# Patient Record
Sex: FEMALE | Race: WHITE | NOT HISPANIC OR LATINO | Employment: UNEMPLOYED | URBAN - METROPOLITAN AREA
[De-identification: names, ages, dates, MRNs, and addresses within clinical notes are randomized per-mention and may not be internally consistent; named-entity substitution may affect disease eponyms.]

---

## 2018-07-17 ENCOUNTER — OFFICE VISIT (OUTPATIENT)
Dept: FAMILY MEDICINE | Facility: CLINIC | Age: 33
End: 2018-07-17
Payer: MEDICARE

## 2018-07-17 ENCOUNTER — TELEPHONE (OUTPATIENT)
Dept: FAMILY MEDICINE | Facility: CLINIC | Age: 33
End: 2018-07-17

## 2018-07-17 ENCOUNTER — RADIANT APPOINTMENT (OUTPATIENT)
Dept: GENERAL RADIOLOGY | Facility: CLINIC | Age: 33
End: 2018-07-17
Payer: MEDICARE

## 2018-07-17 VITALS
WEIGHT: 154 LBS | SYSTOLIC BLOOD PRESSURE: 118 MMHG | TEMPERATURE: 98.2 F | HEART RATE: 70 BPM | DIASTOLIC BLOOD PRESSURE: 77 MMHG | RESPIRATION RATE: 14 BRPM | HEIGHT: 66 IN | BODY MASS INDEX: 24.75 KG/M2 | OXYGEN SATURATION: 99 %

## 2018-07-17 DIAGNOSIS — N89.8 VAGINAL DISCHARGE: ICD-10-CM

## 2018-07-17 DIAGNOSIS — G47.00 INSOMNIA, UNSPECIFIED TYPE: ICD-10-CM

## 2018-07-17 DIAGNOSIS — J30.81 CHRONIC ALLERGIC RHINITIS DUE TO ANIMAL HAIR AND DANDER: ICD-10-CM

## 2018-07-17 DIAGNOSIS — J45.909 UNCOMPLICATED ASTHMA, UNSPECIFIED ASTHMA SEVERITY, UNSPECIFIED WHETHER PERSISTENT: ICD-10-CM

## 2018-07-17 DIAGNOSIS — M79.644 PAIN OF FINGER OF RIGHT HAND: ICD-10-CM

## 2018-07-17 DIAGNOSIS — D47.02 MAST CELL DISEASE, SYSTEMIC: Chronic | ICD-10-CM

## 2018-07-17 DIAGNOSIS — E55.9 HYPOVITAMINOSIS D: ICD-10-CM

## 2018-07-17 DIAGNOSIS — F43.23 ADJUSTMENT DISORDER WITH MIXED ANXIETY AND DEPRESSED MOOD: ICD-10-CM

## 2018-07-17 DIAGNOSIS — Z11.3 SCREEN FOR STD (SEXUALLY TRANSMITTED DISEASE): Primary | ICD-10-CM

## 2018-07-17 DIAGNOSIS — M79.644 PAIN OF FINGER OF RIGHT HAND: Primary | ICD-10-CM

## 2018-07-17 LAB
CLUE CELLS: NORMAL
ERYTHROCYTE [DISTWIDTH] IN BLOOD BY AUTOMATED COUNT: 12.9 % (ref 10–15)
HCT VFR BLD AUTO: 43.3 % (ref 35–47)
HGB BLD-MCNC: 14.8 G/DL (ref 11.7–15.7)
MCH RBC QN AUTO: 32 PG (ref 26.5–33)
MCHC RBC AUTO-ENTMCNC: 34.2 G/DL (ref 31.5–36.5)
MCV RBC AUTO: 94 FL (ref 78–100)
MOTILE TRICHOMONAS: NEGATIVE
PLATELET # BLD AUTO: 220 10E9/L (ref 150–450)
RBC # BLD AUTO: 4.62 10E12/L (ref 3.8–5.2)
TSH SERPL DL<=0.005 MIU/L-ACNC: 2.02 MU/L (ref 0.4–4)
VIT B12 SERPL-MCNC: 356 PG/ML (ref 193–986)
WBC # BLD AUTO: 8.4 10E9/L (ref 4–11)
YEAST: NORMAL

## 2018-07-17 RX ORDER — FLUTICASONE PROPIONATE 50 MCG
2 SPRAY, SUSPENSION (ML) NASAL DAILY
Qty: 1 BOTTLE | Refills: 3 | Status: SHIPPED | OUTPATIENT
Start: 2018-07-17

## 2018-07-17 RX ORDER — AMITRIPTYLINE HYDROCHLORIDE 10 MG/1
TABLET ORAL
Qty: 30 TABLET | Refills: 0 | Status: SHIPPED | OUTPATIENT
Start: 2018-07-17

## 2018-07-17 RX ORDER — ALBUTEROL SULFATE 90 UG/1
1-2 AEROSOL, METERED RESPIRATORY (INHALATION) EVERY 4 HOURS PRN
Qty: 8.5 G | Refills: 2 | Status: SHIPPED | OUTPATIENT
Start: 2018-07-17

## 2018-07-17 RX ORDER — EPINEPHRINE 0.3 MG/.3ML
0.3 INJECTION SUBCUTANEOUS
Qty: 0.3 ML | Refills: 1 | Status: SHIPPED | OUTPATIENT
Start: 2018-07-17

## 2018-07-17 RX ORDER — LORATADINE 10 MG/1
10 TABLET ORAL DAILY
Qty: 30 TABLET | Refills: 11 | Status: SHIPPED | OUTPATIENT
Start: 2018-07-17

## 2018-07-17 RX ORDER — MULTIVIT WITH CALCIUM,IRON,MIN
1 TABLET ORAL DAILY
Qty: 100 TABLET | Refills: 1 | Status: SHIPPED | OUTPATIENT
Start: 2018-07-17

## 2018-07-17 ASSESSMENT — ANXIETY QUESTIONNAIRES
7. FEELING AFRAID AS IF SOMETHING AWFUL MIGHT HAPPEN: NEARLY EVERY DAY
3. WORRYING TOO MUCH ABOUT DIFFERENT THINGS: NEARLY EVERY DAY
GAD7 TOTAL SCORE: 21
IF YOU CHECKED OFF ANY PROBLEMS ON THIS QUESTIONNAIRE, HOW DIFFICULT HAVE THESE PROBLEMS MADE IT FOR YOU TO DO YOUR WORK, TAKE CARE OF THINGS AT HOME, OR GET ALONG WITH OTHER PEOPLE: EXTREMELY DIFFICULT
6. BECOMING EASILY ANNOYED OR IRRITABLE: NEARLY EVERY DAY
5. BEING SO RESTLESS THAT IT IS HARD TO SIT STILL: NEARLY EVERY DAY
IF YOU CHECKED OFF ANY PROBLEMS ON THIS QUESTIONNAIRE, HOW DIFFICULT HAVE THESE PROBLEMS MADE IT FOR YOU TO DO YOUR WORK, TAKE CARE OF THINGS AT HOME, OR GET ALONG WITH OTHER PEOPLE: EXTREMELY DIFFICULT
6. BECOMING EASILY ANNOYED OR IRRITABLE: NEARLY EVERY DAY
2. NOT BEING ABLE TO STOP OR CONTROL WORRYING: NEARLY EVERY DAY
1. FEELING NERVOUS, ANXIOUS, OR ON EDGE: NEARLY EVERY DAY
3. WORRYING TOO MUCH ABOUT DIFFERENT THINGS: NEARLY EVERY DAY
1. FEELING NERVOUS, ANXIOUS, OR ON EDGE: NEARLY EVERY DAY
GAD7 TOTAL SCORE: 21
2. NOT BEING ABLE TO STOP OR CONTROL WORRYING: NEARLY EVERY DAY
5. BEING SO RESTLESS THAT IT IS HARD TO SIT STILL: NEARLY EVERY DAY
7. FEELING AFRAID AS IF SOMETHING AWFUL MIGHT HAPPEN: NEARLY EVERY DAY

## 2018-07-17 ASSESSMENT — PATIENT HEALTH QUESTIONNAIRE - PHQ9
5. POOR APPETITE OR OVEREATING: NEARLY EVERY DAY
5. POOR APPETITE OR OVEREATING: NEARLY EVERY DAY

## 2018-07-17 NOTE — LETTER
July 18, 2018       TO: Reba Arboleda  [address]  [facility] [address]       DearMs.Nkechi,    We are writing to inform you of your test results.    Your test results fall within the expected range(s) or remain unchanged from previous results.  Please continue with current treatment plan.    Resulted Orders   NEISSERIA GONORRHOEA PCR   Result Value Ref Range    Specimen Descrip Vagina     N Gonorrhea PCR Negative NEG^Negative      Comment:      Negative for N. gonorrhoeae rRNA by transcription mediated amplification.  A negative result by transcription mediated amplification does not preclude   the presence of N. gonorrhoeae infection because results are dependent on   proper and adequate collection, absence of inhibitors, and sufficient rRNA to   be detected.     CHLAMYDIA TRACHOMATIS PCR   Result Value Ref Range    Specimen Description Vagina     Chlamydia Trachomatis PCR Negative NEG^Negative      Comment:      Negative for C. trachomatis rRNA by transcription mediated amplification.  A negative result by transcription mediated amplification does not preclude   the presence of C. trachomatis infection because results are dependent on   proper and adequate collection, absence of inhibitors, and sufficient rRNA to   be detected.     Treponema Abs w Reflex to RPR and Titer   Result Value Ref Range    Treponema Antibodies Nonreactive NR^Nonreactive   Herpes Simplex Virus 1 and 2 IgG   Result Value Ref Range    Herpes Simplex Virus Type 1 IgG 3.8 (H) 0.0 - 0.8 AI      Comment:      Positive.  IgG antibody to HSV-1 detected.  Antibody index (AI) values reflect qualitative changes in antibody   concentration that cannot be directly associated with clinical condition or   disease state.      Herpes Simplex Virus Type 2 IgG <0.2 0.0 - 0.8 AI      Comment:      No HSV-2 IgG antibodies detected.  Antibody index (AI) values reflect qualitative changes in antibody   concentration that cannot be directly associated with  clinical condition or   disease state.     Wet Prep (AP [facility] NP CLIN[facility])   Result Value Ref Range    Yeast Wet Prep None none    Motile Trichomonas Wet Prep Negative Negative    Clue Cells Wet Prep Present <20% NONE   Vitamin D Level   Result Value Ref Range    Vitamin D Deficiency screening 23 20 - 75 ug/L      Comment:      Season, race, dietary intake, and treatment affect the concentration of   25-hydroxy-Vitamin D. Values may decrease during winter months and increase   during summer months. Values 20-29 ug/L may indicate Vitamin D insufficiency   and values <20 ug/L may indicate Vitamin D deficiency.  Vitamin D determination is routinely performed by an immunoassay specific for   25 hydroxyvitamin D3.  If an individual is on vitamin D2 (ergocalciferol)   supplementation, please specify 25 OH vitamin D2 and D3 level determination by   LCMSMS test [id number].     Vitamin B12   Result Value Ref Range    Vitamin B12 356 193 - 986 pg/mL   CBC with platelets   Result Value Ref Range    WBC 8.4 4.0 - 11.0 10e9/L    RBC Count 4.62 3.8 - 5.2 10e12/L    Hemoglobin 14.8 11.7 - 15.7 g/dL    Hematocrit 43.3 35.0 - 47.0 %    MCV 94 78 - 100 fl    MCH 32.0 26.5 - 33.0 pg    MCHC 34.2 31.5 - 36.5 g/dL    RDW 12.9 10.0 - 15.0 %    Platelet Count 220 150 - 450 10e9/L   TSH with free T4 reflex   Result Value Ref Range    TSH 2.02 0.40 - 4.00 mU/L       Reba, your lab results to date look good. Your herpes test shows antibodies to Type 1 but not to Type 2 (genital) type so there is no change there from your baseline. Your chlamydia and gonorrhea tests are both negative as is the syphilis test. Complete blood count and thyroid are both within normal limits. The wet prep of your vaginal fluid is also listed here and we reviewed that in clinic. There is no evidence of infection that requires treatment. Your B12 level is within  Normal. Your vitamin D level is low so would recommend daily supplement of 3710-1968 units per day. We could  also replace at 50,000 units per week for 8 weeks. Let me know what you would like to do. The other results are still pending. Thank you,    Nadeen SNOW CNP

## 2018-07-17 NOTE — LETTER
July 19, 2018       TO: Reba Arboleda  [address] [facility] [address]       DearMs.Nkechi,    We are writing to inform you of your test results.    Test results indicate you may require additional follow up, see comment below.    Resulted Orders   HIV Antigen Antibody Combo   Result Value Ref Range    HIV Antigen Antibody Combo Nonreactive NR^Nonreactive          Comment:      HIV-1 p24 Ag & HIV-1/HIV-2 Ab Not Detected   NEISSERIA GONORRHOEA PCR   Result Value Ref Range    Specimen Descrip Vagina     N Gonorrhea PCR Negative NEG^Negative      Comment:      Negative for N. gonorrhoeae rRNA by transcription mediated amplification.  A negative result by transcription mediated amplification does not preclude   the presence of N. gonorrhoeae infection because results are dependent on   proper and adequate collection, absence of inhibitors, and sufficient rRNA to   be detected.     CHLAMYDIA TRACHOMATIS PCR   Result Value Ref Range    Specimen Description Vagina     Chlamydia Trachomatis PCR Negative NEG^Negative      Comment:      Negative for C. trachomatis rRNA by transcription mediated amplification.  A negative result by transcription mediated amplification does not preclude   the presence of C. trachomatis infection because results are dependent on   proper and adequate collection, absence of inhibitors, and sufficient rRNA to   be detected.     Treponema Abs w Reflex to RPR and Titer   Result Value Ref Range    Treponema Antibodies Nonreactive NR^Nonreactive   Herpes Simplex Virus 1 and 2 IgG   Result Value Ref Range    Herpes Simplex Virus Type 1 IgG 3.8 (H) 0.0 - 0.8 AI      Comment:      Positive.  IgG antibody to HSV-1 detected.  Antibody index (AI) values reflect qualitative changes in antibody   concentration that cannot be directly associated with clinical condition or   disease state.      Herpes Simplex Virus Type 2 IgG <0.2 0.0 - 0.8 AI      Comment:      No HSV-2 IgG antibodies detected.  Antibody  index (AI) values reflect qualitative changes in antibody   concentration that cannot be directly associated with clinical condition or   disease state.     Hepatitis C antibody   Result Value Ref Range    Hepatitis C Antibody Nonreactive NR^Nonreactive      Comment:      Assay performance characteristics have not been established for newborns,   infants, and children     Hepatitis B Surface Antibody   Result Value Ref Range    Hepatitis B Surface Antibody 1.41 <8.00 m[IU]/mL      Comment:      Nonreactive, No antibody detected when the value is less than 8.00 m[IU]/mL.   Hepatitis B surface antigen   Result Value Ref Range    Hep B Surface Agn Nonreactive NR^Nonreactive   Wet Prep (AP [facility] NP [facility]IN[facility])   Result Value Ref Range    Yeast Wet Prep None none    Motile Trichomonas Wet Prep Negative Negative    Clue Cells Wet Prep Present <20% NONE   Vitamin D Level   Result Value Ref Range    Vitamin D Deficiency screening 23 20 - 75 ug/L      Comment:      Season, race, dietary intake, and treatment affect the concentration of   25-hydroxy-Vitamin D. Values may decrease during winter months and increase   during summer months. Values 20-29 ug/L may indicate Vitamin D insufficiency   and values <20 ug/L may indicate Vitamin D deficiency.  Vitamin D determination is routinely performed by an immunoassay specific for   25 hydroxyvitamin D3.  If an individual is on vitamin D2 (ergocalciferol)   supplementation, please specify 25 OH vitamin D2 and D3 level determination by   LCMSMS test [id number].     Vitamin B12   Result Value Ref Range    Vitamin B12 356 193 - 986 pg/mL   CBC with platelets   Result Value Ref Range    WBC 8.4 4.0 - 11.0 10e9/L    RBC Count 4.62 3.8 - 5.2 10e12/L    Hemoglobin 14.8 11.7 - 15.7 g/dL    Hematocrit 43.3 35.0 - 47.0 %    MCV 94 78 - 100 fl    MCH 32.0 26.5 - 33.0 pg    MCHC 34.2 31.5 - 36.5 g/dL    RDW 12.9 10.0 - 15.0 %    Platelet Count 220 150 - 450 10e9/L   TSH with free T4 reflex   Result Value  Ref Range    TSH 2.02 0.40 - 4.00 mU/L       Here are the remainder of your lab tests. Screening for hepatitis B and hepatitis C and HIV all negative ( you do not have). You also do not have antibodies to hepatitis B so would recommend vaccination ( a series of 3 shots).     Please let me know if you have any questions.     Thank you,    Nadeen SNOW CNP

## 2018-07-17 NOTE — MR AVS SNAPSHOT
After Visit Summary   7/17/2018    Reba Arboleda    MRN: [id number]           Patient Information     Date Of Birth          1985        Visit Information        Provider Department      7/17/2018 9:30 AM Nadeen Melchor APRN CNP [facility]        Today's Diagnoses     Screen for STD (sexually transmitted disease)    -  1    Pain of finger of right hand        Adjustment disorder with mixed anxiety and depressed mood        Vaginal discharge        Mast cell disease, systemic        Uncomplicated asthma, unspecified asthma severity, unspecified whether persistent        Other chronic sinusitis        Chronic allergic rhinitis due to animal hair and dander        Hypovitaminosis D        Insomnia, unspecified type          Care Instructions    Right finger pain  Xray right finger  Go to [facility] [address] [place] [address]    Depression and insomnia  Discussed treatment with SSRis  Prefer Amitriptyline which she has used in the past for sleep and depression with good effect. Discussed risks. Seek care if suicidal. Gave numbers for walk in care  Given crisis numbers  Referred to [facility]  Amitriptyline started at 10mg per night, after 3 days, may increase to 2 tabs at bedtime, after another 3-7d, may increase to 3 tabs at night. Will cause drowsiness  Follow up here or with Primary care in 2 weeks  CBC, TSH, Vitamin D B12    STD screen  Wet prep: few clue cells. Will monitor for now.   Will notify when others come back.             Follow-ups after your visit        Your next 10 appointments already scheduled     Jul 26, 2018  1:25 PM CDT   (Arrive by 1:10 PM)   New Patient Visit with Izabela Whitman MD   [facility] ([facility])    [address] [place] [address]  4th Floor  [facility] [id number] [phone]              Future tests that were ordered for you today     Open Future Orders        Priority Expected Expires Ordered    XR  "FINGER RT Routine 7/17/2018 7/17/2019 7/17/2018            Who to contact     Please call your clinic at [phone] to:    Ask questions about your health    Make or cancel appointments    Discuss your medicines    Learn about your test results    Speak to your doctor            Additional Information About Your Visit        Orphazymehart Information     [employer]aredOut gives you secure access to your electronic health record. If you see a primary care provider, you can also send messages to your care team and make appointments. If you have questions, please call your primary care clinic.  If you do not have a primary care provider, please call [phone] and they will assist you.      [employer]aredOut is an electronic gateway that provides easy, online access to your medical records. With [employer], you can request a clinic appointment, read your test results, renew a prescription or communicate with your care team.     To access your existing account, please contact your [facility] or call [phone] for assistance.        Care EveryWhere ID     This is your Care EveryWhere ID. This could be used by other organizations to access your [facility] medical records  [id number]        Your Vitals Were     Pulse Temperature Respirations Height Pulse Oximetry Breastfeeding?    70 98.2  F (36.8  C) (Oral) 14 5' 6\" (167.6 cm) 99% No    BMI (Body Mass Index)                   24.86 kg/m2            Blood Pressure from Last 3 Encounters:   07/17/18 118/77   02/23/16 (!) 122/100   02/20/16 106/64    Weight from Last 3 Encounters:   07/17/18 154 lb (69.9 kg)   02/23/16 168 lb (76.2 kg)   02/20/16 167 lb (75.8 kg)              We Performed the Following     CBC with platelets     CHLAMYDIA TRACHOMATIS PCR     Hepatitis B Surface Antibody     Hepatitis B surface antigen     Hepatitis C antibody     Herpes Simplex Virus 1 and 2 IgG     HIV Antigen Antibody Combo     NEISSERIA GONORRHOEA PCR     Treponema Abs w Reflex " to RPR and Titer     TSH with free T4 reflex     Vitamin B12     Vitamin D Level     Wet Prep (AP P NP CLINIC)          Today's Medication Changes          These changes are accurate as of 7/17/18 11:30 AM.  If you have any questions, ask your nurse or doctor.               Start taking these medicines.        Dose/Directions    amitriptyline 10 MG tablet   Commonly known as:  ELAVIL   Used for:  Adjustment disorder with mixed anxiety and depressed mood, Insomnia, unspecified type   Started by:  Nadeen Melchor APRN CNP        Start at 1 tab at bedtime for 3 days, then 2 tabs at bedtime for 3 days, then 3 tabs at bedtime.   Quantity:  30 tablet   Refills:  0       MULTIPLE vitamin  S/WOMENS Tabs   Used for:  Adjustment disorder with mixed anxiety and depressed mood   Replaces:  MULTIVITAMIN & MINERAL PO   Started by:  Nadeen Melchor APRN CNP        Dose:  1 tablet   Take 1 tablet by mouth daily   Quantity:  100 tablet   Refills:  1         These medicines have changed or have updated prescriptions.        Dose/Directions    albuterol 108 (90 Base) MCG/ACT Inhaler   Commonly known as:  PROAIR HFA   This may have changed:  See the new instructions.   Used for:  Mast cell disease, systemic, Uncomplicated asthma, unspecified asthma severity, unspecified whether persistent   Changed by:  Nadeen Melchor APRN CNP        Dose:  1-2 puff   Inhale 1-2 puffs into the lungs every 4 hours as needed for shortness of breath / dyspnea or wheezing   Quantity:  8.5 g   Refills:  2       loratadine 10 MG tablet   Commonly known as:  CLARITIN   This may have changed:    - how much to take  - when to take this   Used for:  Other chronic sinusitis   Changed by:  Nadeen Melchor APRN CNP        Dose:  10 mg   Take 1 tablet (10 mg) by mouth daily   Quantity:  30 tablet   Refills:  11         Stop taking these medicines if you haven't already. Please contact your care team if you have questions.      MULTIVITAMIN & MINERAL PO   Replaced by:  MULTIPLE vitamin  S/WOMENS Tabs   Stopped by:  Nadeen Melchor, EDY REGAN                Where to get your medicines      These medications were sent to [place] [id number] [place] [facility] [address] [facility] [address]  [address], [facility] [address]     Phone:  [phone]     albuterol 108 (90 Base) MCG/ACT Inhaler    amitriptyline 10 MG tablet    EPINEPHrine 0.3 MG/0.3ML injection 2-pack    fluticasone 50 MCG/ACT spray    loratadine 10 MG tablet    MULTIPLE vitamin  S/WOMENS Tabs                Primary Care Provider Office Phone # Fax #    Ruth Ann EDY Villalpando [phone] [address] [place] [address]        Equal Access to Services     GORDY KEYES : Hadii aad ku hadasho Sopartha, waaxda luqadaha, qaybta kaalmada adeabdiazizyajanice, dexter moore . So [facility] [phone].    ATENCIÓN: Si habla español, tiene a laguerre disposición servicios gratuitos de asistencia lingüística. Shaun al [phone].    We comply with applicable federal civil rights laws and [place] laws. We do not discriminate on the basis of race, color, national origin, age, disability, sex, sexual orientation, or gender identity.            Thank you!     Thank you for choosing [facility]  for your care. Our goal is always to provide you with excellent care. Hearing back from our patients is one way we can continue to improve our services. Please take a few minutes to complete the written survey that you may receive in the mail after your visit with us. Thank you!             Your Updated Medication List - Protect others around you: Learn how to safely use, store and throw away your medicines at www.disposemymeds.org.          This list is accurate as of 7/17/18 11:30 AM.  Always use your most recent med list.                   Brand Name Dispense Instructions for use Diagnosis    albuterol 108 (90 Base) MCG/ACT Inhaler     PROAIR HFA    8.5 g    Inhale 1-2 puffs into the lungs every 4 hours as needed for shortness of breath / dyspnea or wheezing    Mast cell disease, systemic, Uncomplicated asthma, unspecified asthma severity, unspecified whether persistent       amitriptyline 10 MG tablet    ELAVIL    30 tablet    Start at 1 tab at bedtime for 3 days, then 2 tabs at bedtime for 3 days, then 3 tabs at bedtime.    Adjustment disorder with mixed anxiety and depressed mood, Insomnia, unspecified type       EPINEPHrine 0.3 MG/0.3ML injection 2-pack    EPIPEN 2-KOJO    0.3 mL    Inject 0.3 mLs (0.3 mg) into the muscle once as needed for anaphylaxis    Mast cell disease, systemic       fluticasone 50 MCG/ACT spray    FLONASE    1 Bottle    Spray 2 sprays into both nostrils daily    Chronic allergic rhinitis due to animal hair and dander       levonorgestrel 20 MCG/24HR IUD    MIRENA     1 each by Intrauterine route once        loratadine 10 MG tablet    CLARITIN    30 tablet    Take 1 tablet (10 mg) by mouth daily    Other chronic sinusitis       MULTIPLE vitamin  S/WOMENS Tabs     100 tablet    Take 1 tablet by mouth daily    Adjustment disorder with mixed anxiety and depressed mood       order for DME     1 Device    Equipment being ordered: Donjoy knee/patella stabilizer brace, right    Right knee pain, EDS (Anton-Danlos syndrome)
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
## 2018-07-17 NOTE — TELEPHONE ENCOUNTER
TC to patient. Xray of right finger shows lucency of distal joint at site of pain,swelling, and impaired ROM. Could be nutrient foramen but cannot rule out nondisplaced fracture. Discussed with patient. She would like to see ortho (hand) specialist. Referred, gave patient phone number to schedule appointment. Nadeen SNOW CNP

## 2018-07-17 NOTE — PATIENT INSTRUCTIONS
Right finger pain  Xray right finger  Go to [facility] [address]    Depression and insomnia  Discussed treatment with SSRis  Prefer Amitriptyline which she has used in the past for sleep and depression with good effect. Discussed risks. Seek care if suicidal. Gave numbers for walk in care  Given crisis numbers  Referred to [facility]  Amitriptyline started at 10mg per night, after 3 days, may increase to 2 tabs at bedtime, after another 3-7d, may increase to 3 tabs at night. Will cause drowsiness  Follow up here or with Primary care in 2 weeks  CBC, TSH, Vitamin D B12    STD screen  Wet prep: few clue cells. Will monitor for now.   Will notify when others come back.
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
## 2018-07-18 LAB
C TRACH DNA SPEC QL NAA+PROBE: NEGATIVE
DEPRECATED CALCIDIOL+CALCIFEROL SERPL-MC: 23 UG/L (ref 20–75)
HBV SURFACE AB SERPL IA-ACNC: 1.41 M[IU]/ML
HBV SURFACE AG SERPL QL IA: NONREACTIVE
HCV AB SERPL QL IA: NONREACTIVE
HIV 1+2 AB+HIV1 P24 AG SERPL QL IA: NONREACTIVE
HSV1 IGG SERPL QL IA: 3.8 AI (ref 0–0.8)
HSV2 IGG SERPL QL IA: <0.2 AI (ref 0–0.8)
N GONORRHOEA DNA SPEC QL NAA+PROBE: NEGATIVE
SPECIMEN SOURCE: NORMAL
SPECIMEN SOURCE: NORMAL
T PALLIDUM AB SER QL: NONREACTIVE

## 2018-07-18 ASSESSMENT — ANXIETY QUESTIONNAIRES: GAD7 TOTAL SCORE: 21

## 2018-07-18 ASSESSMENT — PATIENT HEALTH QUESTIONNAIRE - PHQ9: SUM OF ALL RESPONSES TO PHQ QUESTIONS 1-9: 23

## 2018-07-19 ENCOUNTER — TELEPHONE (OUTPATIENT)
Dept: FAMILY MEDICINE | Facility: CLINIC | Age: 33
End: 2018-07-19

## 2018-07-20 NOTE — TELEPHONE ENCOUNTER
FUTURE VISIT INFORMATION      FUTURE VISIT INFORMATION:    Date: 7/23/18    Time: 6:30    Location:   REFERRAL INFORMATION:    Referring provider:  Nadeen Melchor                           Referring providers clinic: [facility]    Reason for visit/diagnosis: Pain of finger of right hand        RECORDS STATUS      ALL RECORDS AND IMAGING INTERNAL

## 2018-07-23 ENCOUNTER — PRE VISIT (OUTPATIENT)
Dept: ORTHOPEDICS | Facility: CLINIC | Age: 33
End: 2018-07-23

## 2018-07-23 ENCOUNTER — OFFICE VISIT (OUTPATIENT)
Dept: ORTHOPEDICS | Facility: CLINIC | Age: 33
End: 2018-07-23
Payer: MEDICARE

## 2018-07-23 VITALS
HEIGHT: 66 IN | SYSTOLIC BLOOD PRESSURE: 107 MMHG | WEIGHT: 154 LBS | BODY MASS INDEX: 24.75 KG/M2 | HEART RATE: 94 BPM | DIASTOLIC BLOOD PRESSURE: 68 MMHG

## 2018-07-23 DIAGNOSIS — S69.91XA INJURY OF RIGHT RING FINGER, INITIAL ENCOUNTER: Primary | ICD-10-CM

## 2018-07-23 RX ORDER — HYDROCODONE BITARTRATE AND ACETAMINOPHEN 5; 325 MG/1; MG/1
1 TABLET ORAL EVERY 6 HOURS PRN
Qty: 8 TABLET | Refills: 0 | Status: SHIPPED | OUTPATIENT
Start: 2018-07-23
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
## 2018-07-23 NOTE — PROGRESS NOTES
SPORTS & ORTHOPEDIC WALK-IN VISIT 7/23/2018    Primary Care Physician: Dr. Saenz        Reason for visit:     What part of your body is injured / painful?  right fourth finger    What caused the injury /pain? {DS INJURY CAUSE:285087}    How long ago did your injury occur or pain begin? {DOI:435830}    What are your most bothersome symptoms? {SYMPTOMS:257301}    How would you characterize your symptom?  {PAIN ASSESSMENT:523986}    What makes your symptoms better? {SX BETTER:979131}    What makes your symptoms worse? {SX WORSE:467984}    Have you been previously seen for this problem? Yes, FP    Medical History:    Any recent changes to your medical history? {YES/NO:637130}    Any new medication prescribed since last visit? {YES/NO:369168}    Have you had surgery on this body part before? {YES/NO SX:248487}    Social History:    Occupation: ***    Handedness: {HANDDOMINANCE:231565}    Exercise: {EXERCISE TYPE:987216}    Review of Systems:    Do you have fever, chills, weight loss? {YES/NO:320112}    Do you have any vision problems? {YES/NO:410311}    Do you have any chest pain or edema? {YES/NO:701667}    Do you have any shortness of breath or wheezing?  {YES/NO:702194}    Do you have stomach problems? {YES/NO:312102}    Do you have any numbness or focal weakness? {YES/NO:790615}    Do you have diabetes? {YES/NO:956001}    Do you have problems with bleeding or clotting? {YES/NO:049185}    Do you have an rashes or other skin lesions? {YES/NO:447338}
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
## 2018-07-23 NOTE — MR AVS SNAPSHOT
After Visit Summary   7/23/2018    Reba Arboleda    MRN: [id number]           Patient Information     Date Of Birth          1985        Visit Information        Provider Department      7/23/2018 1:40 PM Az Neely MD [facility]        Today's Diagnoses     Injury of right ring finger, initial encounter    -  1       Follow-ups after your visit        Your next 10 appointments already scheduled     Jul 24, 2018  4:00 PM CDT   MR FINGER RIGHT W/O CONTRAST with [id number]   [facility] ([facility] [place])    [address] [address] [phone]           Take your medicines as usual, unless your doctor tells you not to. Bring a list of your current medicines to your exam (including vitamins, minerals and over-the-counter drugs). Also bring the results of similar scans you may have had.  Please remove any body piercings and hair extensions before you arrive.  Follow your doctor s orders. If you do not, we may have to postpone your exam.  You may or may not receive IV contrast for this exam pending the discretion of the Radiologist.  You do not need to do anything special to prepare.  The MRI machine uses a strong magnet. Please wear clothes without metal (snaps, zippers). A sweatsuit works well, or we may give you a hospital gown.   **IMPORTANT** THE INSTRUCTIONS BELOW ARE ONLY FOR THOSE PATIENTS WHO HAVE BEEN PRESCRIBED SEDATION OR GENERAL ANESTHESIA DURING THEIR MRI PROCEDURE:  IF YOUR DOCTOR PRESCRIBED ORAL SEDATION (take medicine to help you relax during your exam):   You must get the medicine from your doctor (oral medication) before you arrive. Bring the medicine to the exam. Do not take it at home. You ll be told when to take it upon arriving for your exam.   Arrive one hour early. Bring someone who can take you home after the test. Your medicine will make you sleepy. After  the exam, you may not drive, take a bus or take a taxi by yourself.  IF YOUR DOCTOR PRESCRIBED IV SEDATION:   Arrive one hour early. Bring someone who can take you home after the test. Your medicine will make you sleepy. After the exam, you may not drive, take a bus or take a taxi by yourself.   No eating 6 hours before your exam. You may have clear liquids up until 4 hours before your exam. (Clear liquids include water, clear tea, black coffee and fruit juice without pulp.)  IF YOUR DOCTOR PRESCRIBED ANESTHESIA (be asleep for your exam):   Arrive 1 1/2 hours early. Bring someone who can take you home after the test. You may not drive, take a bus or take a taxi by yourself.   No eating 8 hours before your exam. You may have clear liquids up until 4 hours before your exam. (Clear liquids include water, clear tea, black coffee and fruit juice without pulp.)   You will spend four to five hours in the recovery room.  Please call the Imaging Department at your exam site with any questions.            Jul 25, 2018 10:30 AM CDT   (Arrive by 10:15 AM)   NEW HAND with Ponce Sorenson MD   [facility] ([facility] [place])    [address] [place] [address]  [address] [facility] [address] [phone]            Jul 26, 2018 12:15 PM CDT   (Arrive by 12:00 PM)   New Patient Visit with Juan Carlos Banda MD   [facility] ([facility] [place])    [address] [place] [address]  [address] [facility] [address]   [phone]            Jul 26, 2018  1:25 PM CDT   (Arrive by 1:10 PM)   New Patient Visit with Izabela Whitman MD   [facility] ([facility] [place])    [address] [facility]  [address] [facility] [address] [phone]              Future tests that were ordered for you today     Open Future Orders        Priority Expected Expires Ordered    MR Finger Right w/o Contrast Routine  7/23/2019 7/23/2018            Who  "to contact     Please call your clinic at [phone] to:    Ask questions about your health    Make or cancel appointments    Discuss your medicines    Learn about your test results    Speak to your doctor            Additional Information About Your Visit        [employer] Information     [employer] gives you secure access to your electronic health record. If you see a primary care provider, you can also send messages to your care team and make appointments. If you have questions, please call your primary care clinic.  If you do not have a primary care provider, please call [phone] and they will assist you.      [employer] is an electronic gateway that provides easy, online access to your medical records. With [employer], you can request a clinic appointment, read your test results, renew a prescription or communicate with your care team.     To access your existing account, please contact your [facility] or call [phone] for assistance.        Care EveryWhere ID     This is your Care EveryWhere ID. This could be used by other organizations to access your [facility] medical records  [id number]        Your Vitals Were     Pulse Height BMI (Body Mass Index)             94 1.676 m (5' 6\") 24.86 kg/m2          Blood Pressure from Last 3 Encounters:   07/23/18 107/68   07/17/18 118/77   02/23/16 (!) 122/100    Weight from Last 3 Encounters:   07/23/18 69.9 kg (154 lb)   07/17/18 69.9 kg (154 lb)   02/23/16 76.2 kg (168 lb)                 Today's Medication Changes          These changes are accurate as of 7/23/18  3:32 PM.  If you have any questions, ask your nurse or doctor.               Start taking these medicines.        Dose/Directions    HYDROcodone-acetaminophen 5-325 MG per tablet   Commonly known as:  NORCO   Used for:  Injury of right ring finger, initial encounter   Started by:  Az Neely MD        Dose:  1 tablet   Take 1 tablet by mouth every 6 hours as needed " for severe pain   Quantity:  8 tablet   Refills:  0            Where to get your medicines      Some of these will need a paper prescription and others can be bought over the counter.  Ask your nurse if you have questions.     Bring a paper prescription for each of these medications     HYDROcodone-acetaminophen 5-325 MG per tablet               Information about OPIOIDS     PRESCRIPTION OPIOIDS: WHAT YOU NEED TO KNOW   We gave you an opioid (narcotic) pain medicine. It is important to manage your pain, but opioids are not always the best choice. You should first try all the other options your care team gave you. Take this medicine for as short a time (and as few doses) as possible.     These medicines have risks:    DO NOT drive when on new or higher doses of pain medicine. These medicines can affect your alertness and reaction times, and you could be arrested for driving under the influence (DUI). If you need to use opioids long-term, talk to your care team about driving.    DO NOT operate heave machinery    DO NOT do any other dangerous activities while taking these medicines.     DO NOT drink any alcohol while taking these medicines.      If the opioid prescribed includes acetaminophen, DO NOT take with any other medicines that contain acetaminophen. Read all labels carefully. Look for the word  acetaminophen  or  Tylenol.  Ask your pharmacist if you have questions or are unsure.    You can get addicted to pain medicines, especially if you have a history of addiction (chemical, alcohol or substance dependence). Talk to your care team about ways to reduce this risk.    Store your pills in a secure place, locked if possible. We will not replace any lost or stolen medicine. If you don t finish your medicine, please throw away (dispose) as directed by your pharmacist. The [place] Pollution Control Agency has more information about safe disposal:  https://www.pca.[facility].[place].us/living-green/managing-unwanted-medications.     All opioids tend to cause constipation. Drink plenty of water and eat foods that have a lot of fiber, such as fruits, vegetables, prune juice, apple juice and high-fiber cereal. Take a laxative (Miralax, milk of magnesia, Colace, Senna) if you don t move your bowels at least every other day.          Primary Care Provider Office Phone # Fax #    EDY Zacarias [phone] [address] [place] [address]        Equal Access to Services     GORDY KEYES : Hadii sherry Aguillon, waaxda luqadaha, qaybta kaalmada yana, dexter moore . So [facility] [phone].    ATENCIÓN: Si habla español, tiene a laguerre disposición servicios gratuitos de asistencia lingüística. Llame al [phone].    We comply with applicable federal civil rights laws and [place] laws. We do not discriminate on the basis of race, color, national origin, age, disability, sex, sexual orientation, or gender identity.            Thank you!     Thank you for choosing [facility] SPORTS AND ORTHOPAEDIC WALK IN CLINIC  for your care. Our goal is always to provide you with excellent care. Hearing back from our patients is one way we can continue to improve our services. Please take a few minutes to complete the written survey that you may receive in the mail after your visit with us. Thank you!             Your Updated Medication List - Protect others around you: Learn how to safely use, store and throw away your medicines at www.disposemymeds.org.          This list is accurate as of 7/23/18  3:32 PM.  Always use your most recent med list.                   Brand Name Dispense Instructions for use Diagnosis    albuterol 108 (90 Base) MCG/ACT Inhaler    PROAIR HFA    8.5 g    Inhale 1-2 puffs into the lungs every 4 hours as needed for shortness of breath / dyspnea or wheezing    Mast cell disease,  systemic, Uncomplicated asthma, unspecified asthma severity, unspecified whether persistent       amitriptyline 10 MG tablet    ELAVIL    30 tablet    Start at 1 tab at bedtime for 3 days, then 2 tabs at bedtime for 3 days, then 3 tabs at bedtime.    Adjustment disorder with mixed anxiety and depressed mood, Insomnia, unspecified type       EPINEPHrine 0.3 MG/0.3ML injection 2-pack    EPIPEN 2-KOJO    0.3 mL    Inject 0.3 mLs (0.3 mg) into the muscle once as needed for anaphylaxis    Mast cell disease, systemic       fluticasone 50 MCG/ACT spray    FLONASE    1 Bottle    Spray 2 sprays into both nostrils daily    Chronic allergic rhinitis due to animal hair and dander       HYDROcodone-acetaminophen 5-325 MG per tablet    NORCO    8 tablet    Take 1 tablet by mouth every 6 hours as needed for severe pain    Injury of right ring finger, initial encounter       levonorgestrel 20 MCG/24HR IUD    MIRENA     1 each by Intrauterine route once        loratadine 10 MG tablet    CLARITIN    30 tablet    Take 1 tablet (10 mg) by mouth daily        MULTIPLE vitamin  S/WOMENS Tabs     100 tablet    Take 1 tablet by mouth daily    Adjustment disorder with mixed anxiety and depressed mood       order for DME     1 Device    Equipment being ordered: [employer] knee/patella stabilizer brace, right    Right knee pain, EDS (Anton-Danlos syndrome)

## 2018-07-24 ENCOUNTER — RADIANT APPOINTMENT (OUTPATIENT)
Dept: MRI IMAGING | Facility: CLINIC | Age: 33
End: 2018-07-24
Attending: FAMILY MEDICINE
Payer: MEDICARE

## 2018-07-24 DIAGNOSIS — S69.91XA INJURY OF RIGHT RING FINGER, INITIAL ENCOUNTER: ICD-10-CM

## 2018-07-25 NOTE — TELEPHONE ENCOUNTER
FUTURE VISIT INFORMATION      FUTURE VISIT INFORMATION:    Date: 7/26/18    Time: 12:15    Location:   REFERRAL INFORMATION:    Referring provider:  SELF    Referring providers clinic:     Reason for visit/diagnosis  RIGHT KNEE PAIN    RECORDS REQUESTED FROM:       Clinic name Comments Records Status Imaging Status    [place] RECORDS IN CARE EVERYWHERE  Images inPAC   [place]  RECORDS IN CARE EVERYWHERE                               RECORDS STATUS

## 2018-07-26 ENCOUNTER — OFFICE VISIT (OUTPATIENT)
Dept: INTERNAL MEDICINE | Facility: CLINIC | Age: 33
End: 2018-07-26
Payer: MEDICARE

## 2018-07-26 ENCOUNTER — PRE VISIT (OUTPATIENT)
Dept: ORTHOPEDICS | Facility: CLINIC | Age: 33
End: 2018-07-26

## 2018-07-26 ENCOUNTER — OFFICE VISIT (OUTPATIENT)
Dept: ORTHOPEDICS | Facility: CLINIC | Age: 33
End: 2018-07-26
Payer: MEDICARE

## 2018-07-26 ENCOUNTER — TELEPHONE (OUTPATIENT)
Dept: ORTHOPEDICS | Facility: CLINIC | Age: 33
End: 2018-07-26

## 2018-07-26 VITALS
BODY MASS INDEX: 24.91 KG/M2 | HEART RATE: 75 BPM | DIASTOLIC BLOOD PRESSURE: 65 MMHG | HEIGHT: 67 IN | WEIGHT: 158.7 LBS | SYSTOLIC BLOOD PRESSURE: 106 MMHG

## 2018-07-26 VITALS
DIASTOLIC BLOOD PRESSURE: 68 MMHG | SYSTOLIC BLOOD PRESSURE: 107 MMHG | BODY MASS INDEX: 24.75 KG/M2 | HEIGHT: 66 IN | WEIGHT: 154 LBS

## 2018-07-26 DIAGNOSIS — Z87.828 H/O TEAR OF MENISCUS OF KNEE JOINT: Primary | ICD-10-CM

## 2018-07-26 DIAGNOSIS — M22.41 CHONDROMALACIA OF RIGHT PATELLA: ICD-10-CM

## 2018-07-26 DIAGNOSIS — F42.8 OTHER OBSESSIVE-COMPULSIVE DISORDERS: ICD-10-CM

## 2018-07-26 DIAGNOSIS — F43.22 ADJUSTMENT DISORDER WITH ANXIOUS MOOD: Primary | ICD-10-CM

## 2018-07-26 DIAGNOSIS — E55.9 VITAMIN D DEFICIENCY: ICD-10-CM

## 2018-07-26 DIAGNOSIS — Q79.60 EHLERS-DANLOS DISEASE: ICD-10-CM

## 2018-07-26 DIAGNOSIS — F41.9 ANXIETY: ICD-10-CM

## 2018-07-26 DIAGNOSIS — Q22.9: ICD-10-CM

## 2018-07-26 RX ORDER — COPPER 313.4 MG/1
1 INTRAUTERINE DEVICE INTRAUTERINE ONCE
COMMUNITY

## 2018-07-26 RX ORDER — ERGOCALCIFEROL 1.25 MG/1
50000 CAPSULE, LIQUID FILLED ORAL
Qty: 8 CAPSULE | Status: CANCELLED | OUTPATIENT
Start: 2018-07-26

## 2018-07-26 RX ORDER — SWAB
800 SWAB, NON-MEDICATED MISCELLANEOUS DAILY
Qty: 30 CAPSULE | Refills: 3 | Status: SHIPPED | OUTPATIENT
Start: 2018-07-26

## 2018-07-26 ASSESSMENT — PAIN SCALES - GENERAL: PAINLEVEL: SEVERE PAIN (6)
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
## 2018-07-26 NOTE — TELEPHONE ENCOUNTER
The writer was approached to help schedule the patient to see Dr. Novoa today 7/26/2018 at 1:00pm.  The patient had an appointment with Dr. Sorenson on 7/25/2018, they had the appointment date in their phone on the wrong day and missed the appointment with Dr. Sorenson.  The patient was seeing other providers at the [facility] today, 7/26/2018, and asked if they could be seen by a hand provider. The writer approved of the patient making an appointment at 3:00pm with Dr. Novoa.  The writer was notified that Dr. Novoa was going to be running an hour late due to his surgery start time being pushed back at 1:55pm.  As soon as the writer was aware of the check in of the patient she went out to talk to the patient about her appointment.  She was given the option and staying until Dr. Novoa arrived, appointment time at 4:00pm, or making an appointment on Tuesday 7/31/2018 with Dr. Johansen.  The patient was upset and said that she was notified of the appointment half hour before and wanted to know why she wasn't told then that Dr. Novoa was behind.  The writer apologized that there was a miscommunication.  The patient did not want to make an appointment for Tuesday because she had another appointment on Wednesday 8/1/2018.  The writer offered to look at the schedules and see if that would work.  The patient stood up and said that you can call me and she left.  The patient then called into the scheduling line and after a discussion with the clinic staff was offered and accepted an appointment with Dr. Borja tomorrow, Friday 7/27/2018.

## 2018-07-26 NOTE — NURSING NOTE
Chief Complaint   Patient presents with     Establish Care     Relocated from [place]. Has Dx of Anton Shafer.     Referral     Requesting mental health referral.        Cat Reis LPN at 1:25 PM on July 26, 2018

## 2018-07-26 NOTE — MR AVS SNAPSHOT
After Visit Summary   7/26/2018    Reba Arboleda    MRN: [id number]           Patient Information     Date Of Birth          1985        Visit Information        Provider Department      7/26/2018 1:25 PM Izabela Whitman MD [facility]        Today's Diagnoses     Adjustment disorder with anxious mood    -  1    Other obsessive-compulsive disorders        Anton-Danlos disease        Vitamin D deficiency        Anxiety        Congenital malformation of tricuspid valve            Follow-ups after your visit        Additional Services     DERMATOLOGY REFERRAL       Your provider has referred you to: PREFERRED PROVIDERS: [facility]       Please be aware that coverage of these services is subject to the terms and limitations of your health insurance plan.  Call member services at your health plan with any benefit or coverage questions.      Please bring the following with you to your appointment:    (1) Any X-Rays, CTs or MRIs which have been performed.  Contact the facility where they were done to arrange for  prior to your scheduled appointment.    (2) List of current medications  (3) This referral request   (4) Any documents/labs given to you for this referral            OPHTHALMOLOGY ADULT REFERRAL       Your provider has referred you to: [facility]: Eye Clinic [facility] [phone]   [email or web address]  Please be aware that coverage of these services is subject to the terms and limitations of your health insurance plan.  Call member services at your health plan with any benefit or coverage questions.      Please bring the following with you to your appointment:    (1) Any X-Rays, CTs or MRIs which have been performed.  Contact the facility where they were done to arrange for  prior to your scheduled appointment.    (2) List of current medications  (3) This referral request   (4) Any documents/labs given to you for this referral             PSYCHIATRY REFERRAL [facility]       Your provider has referred you to: [facility] for a Primary Care Consultation. Please call [phone] to make an appointment.    Clinic is located at:  [facility], 2nd Floor  [address], [address]  [facility] [address]    Please complete the following questions:    Reason for Referral: depression and anxiety    What specific question is it most critical for you and the patient to have answered? Per blanca hernandez visit - coping mechanisms     You have requested a one-time CONSULTATION visit. This means that although we will make recommendations and provide a multi-step plan where appropriate, the Psychiatry Clinic will not provide ongoing care. We expect that as the referring provider, you will continue to see and manage the patient's care primarily, and we will remain available via Epic for any ongoing questions that arise after the initial visit. In rare and unusually complex cases, we may schedule a follow up visit to complete the assessment, but this should also not be seen as taking over the case.    Is this acceptable to you? Yes            PSYCHOLOGY REFERRAL       Your provider has referred you to:  None    Please be aware that coverage of these services is subject to the terms and limitations of your health insurance plan.  Call member services at your health plan with any benefit or coverage questions.      Please bring the following to your appointment:    >>   Any x-rays, CTs or MRIs which have been performed.  Contact the facility where they were done to arrange for  prior to your scheduled appointment.   >>   List of current medications   >>   This referral request   >>   Any documents/labs given to you for this referral                  Your next 10 appointments already scheduled     Aug 10, 2018  9:30 AM LUIS EDUARDO Mendez with Harriet Lea OT   [facility] ([facility]  [place])    [address] [place] [id number] [phone]            Aug 10, 2018 10:20 AM CDT   [place] Extremity with Nadeem MCCRAY [facility] [place] ([facility])    [address] [place] [id number] [phone]            Aug 17, 2018  9:30 AM CDT   [place] Hand with Harriet Lea OT   [facility] ([facility])    [address] [place] [id number] [phone]            Aug 17, 2018 10:20 AM CDT   (Arrive by 10:05 AM)   RETURN HAND with Erasmo Borja MD   [facility] ([facility])    [address] [place], [place] [id number] [phone]            Aug 17, 2018 11:00 AM CDT   [place] Extremity with Nadeem MCCRAY [facility] [place] ([facility])    [address] [place] [id number] [phone]            Aug 24, 2018  9:30 AM CDT   [place] Hand with Jailyn Quezada OT   [facility] ([facility])    [address] [place] [id number] [phone]            Aug 24, 2018 10:20 AM CDT   [place] Extremity with Nadeem MCCRAY [facility] [place] ([facility])    [address] [place] [id number] [phone]            Sep 11, 2018 10:00 AM CDT   (Arrive by 9:45 AM)   Return Visit with Juan Carlos Banda MD   [facility] Medicine ([facility])    [address] [place] [id number] [phone]              Who to contact     Please call your clinic at [phone] to:    Ask questions about your health    Make or cancel appointments    Discuss your medicines    Learn about your test results    Speak to your doctor            Additional Information  "About Your Visit        [employer] Information     [employer] gives you secure access to your electronic health record. If you see a primary care provider, you can also send messages to your care team and make appointments. If you have questions, please call your primary care clinic.  If you do not have a primary care provider, please call [phone] and they will assist you.      [employer] is an electronic gateway that provides easy, online access to your medical records. With [employer], you can request a clinic appointment, read your test results, renew a prescription or communicate with your care team.     To access your existing account, please contact your [facility] or call [phone] for assistance.        Care EveryWhere ID     This is your Care EveryWhere ID. This could be used by other organizations to access your [place] medical records  [id number]        Your Vitals Were     Pulse Height BMI (Body Mass Index)             75 1.702 m (5' 7\") 24.86 kg/m2          Blood Pressure from Last 3 Encounters:   07/26/18 106/65   07/26/18 107/68   07/23/18 107/68    Weight from Last 3 Encounters:   07/27/18 71.7 kg (158 lb)   07/26/18 72 kg (158 lb 11.2 oz)   07/26/18 69.9 kg (154 lb)              We Performed the Following     DERMATOLOGY REFERRAL     OPHTHALMOLOGY ADULT REFERRAL     PSYCHIATRY REFERRAL [facility]     PSYCHOLOGY REFERRAL          Today's Medication Changes          These changes are accurate as of 7/26/18 11:59 PM.  If you have any questions, ask your nurse or doctor.               Start taking these medicines.        Dose/Directions    Vitamin D (Cholecalciferol) 400 units Caps   Used for:  Vitamin D deficiency   Started by:  Izabela Whitman MD        Dose:  800 Units   Take 800 Units by mouth daily   Quantity:  30 capsule   Refills:  3         These medicines have changed or have updated prescriptions.        Dose/Directions    loratadine 10 MG tablet   Commonly known " as:  CLARITIN   This may have changed:    - when to take this  - reasons to take this        Dose:  10 mg   Take 1 tablet (10 mg) by mouth daily   Quantity:  30 tablet   Refills:  11         Stop taking these medicines if you haven't already. Please contact your care team if you have questions.     levonorgestrel 20 MCG/24HR IUD   Commonly known as:  MIRENA   Stopped by:  Izabela Whitman MD                Where to get your medicines      These medications were sent to [place] [address] [place] [facility] [address]  [address] [facility] [address]     Phone:  [phone]     Vitamin D (Cholecalciferol) 400 units Caps                Primary Care Provider Office Phone # Fax #    EDY Zacarias [phone] [address] [place] [address]        Equal Access to Services     [facility]: Hadii sherry de leon hadasho Soharjitali, waaxda luqadaha, qaybta kaalmada adeegyada, waxay guilhermein hayerik moore . So [facility] [phone].    ATENCIÓN: Si habla español, tiene a laguerre disposición servicios gratuitos de asistencia lingüística. Shaun al [phone].    We comply with applicable federal civil rights laws and [place] laws. We do not discriminate on the basis of race, color, national origin, age, disability, sex, sexual orientation, or gender identity.            Thank you!     Thank you for choosing [facility]  for your care. Our goal is always to provide you with excellent care. Hearing back from our patients is one way we can continue to improve our services. Please take a few minutes to complete the written survey that you may receive in the mail after your visit with us. Thank you!             Your Updated Medication List - Protect others around you: Learn how to safely use, store and throw away your medicines at www.disposemymeds.org.          This list is accurate as of 7/26/18 11:59 PM.  Always use your most recent med  list.                   Brand Name Dispense Instructions for use Diagnosis    albuterol 108 (90 Base) MCG/ACT Inhaler    PROAIR HFA    8.5 g    Inhale 1-2 puffs into the lungs every 4 hours as needed for shortness of breath / dyspnea or wheezing    Mast cell disease, systemic, Uncomplicated asthma, unspecified asthma severity, unspecified whether persistent       amitriptyline 10 MG tablet    ELAVIL    30 tablet    Start at 1 tab at bedtime for 3 days, then 2 tabs at bedtime for 3 days, then 3 tabs at bedtime.    Adjustment disorder with mixed anxiety and depressed mood, Insomnia, unspecified type       EPINEPHrine 0.3 MG/0.3ML injection 2-pack    EPIPEN 2-KOJO    0.3 mL    Inject 0.3 mLs (0.3 mg) into the muscle once as needed for anaphylaxis    Mast cell disease, systemic       fluticasone 50 MCG/ACT spray    FLONASE    1 Bottle    Spray 2 sprays into both nostrils daily    Chronic allergic rhinitis due to animal hair and dander       HYDROcodone-acetaminophen 5-325 MG per tablet    NORCO    8 tablet    Take 1 tablet by mouth every 6 hours as needed for severe pain    Injury of right ring finger, initial encounter       loratadine 10 MG tablet    CLARITIN    30 tablet    Take 1 tablet (10 mg) by mouth daily        MULTIPLE vitamin  S/WOMENS Tabs     100 tablet    Take 1 tablet by mouth daily    Adjustment disorder with mixed anxiety and depressed mood       order for DME     1 Device    Equipment being ordered: Donjoy knee/patella stabilizer brace, right    Right knee pain, EDS (Anton-Danlos syndrome)       paragard intrauterine copper      1 each by Intrauterine route once        Vitamin D (Cholecalciferol) 400 units Caps     30 capsule    Take 800 Units by mouth daily    Vitamin D deficiency
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
## 2018-07-26 NOTE — MR AVS SNAPSHOT
After Visit Summary   7/26/2018    Reba Arboleda    MRN: [id number]           Patient Information     Date Of Birth          1985        Visit Information        Provider Department      7/26/2018 12:15 PM Juan Carlos Banda MD [facility]        Today's Diagnoses     H/O tear of meniscus of knee joint    -  1    Chondromalacia of right patella           Follow-ups after your visit        Additional Services     PHYSICAL THERAPY REFERRAL (Internal)       Physical Therapy Referral                  Your next 10 appointments already scheduled     Jul 30, 2018  9:50 AM CDT   (Arrive by 9:35 AM)   [facility] Extremity with Cassandra Sky PT   [facility] ([facility] [place]  )    [id number]  [facility] [id number] [phone]            Aug 01, 2018 12:00 PM CDT   ARMAND Hand with Harriet Lea OT   [facility] ([facility])    [address]  4th Floor  [facility] [id number] [phone]            Aug 01, 2018  1:45 PM CDT   MR KNEE RIGHT W/O CONTRAST with [id number]   [facility] ([facility])    [address]  1st Floor  [facility] [id number] [phone]           Take your medicines as usual, unless your doctor tells you not to. Bring a list of your current medicines to your exam (including vitamins, minerals and over-the-counter drugs). Also bring the results of similar scans you may have had.  Please remove any body piercings and hair extensions before you arrive.  Follow your doctor s orders. If you do not, we may have to postpone your exam.  You may or may not receive IV contrast for this exam pending the discretion of the Radiologist.  You do not need to do anything special to prepare.  The MRI machine uses a strong magnet. Please wear clothes without metal (snaps, zippers). A sweatsuit works well, or we may give you a hospital  kimberly.   **IMPORTANT** THE INSTRUCTIONS BELOW ARE ONLY FOR THOSE PATIENTS WHO HAVE BEEN PRESCRIBED SEDATION OR GENERAL ANESTHESIA DURING THEIR MRI PROCEDURE:  IF YOUR DOCTOR PRESCRIBED ORAL SEDATION (take medicine to help you relax during your exam):   You must get the medicine from your doctor (oral medication) before you arrive. Bring the medicine to the exam. Do not take it at home. You ll be told when to take it upon arriving for your exam.   Arrive one hour early. Bring someone who can take you home after the test. Your medicine will make you sleepy. After the exam, you may not drive, take a bus or take a taxi by yourself.  IF YOUR DOCTOR PRESCRIBED IV SEDATION:   Arrive one hour early. Bring someone who can take you home after the test. Your medicine will make you sleepy. After the exam, you may not drive, take a bus or take a taxi by yourself.   No eating 6 hours before your exam. You may have clear liquids up until 4 hours before your exam. (Clear liquids include water, clear tea, black coffee and fruit juice without pulp.)  IF YOUR DOCTOR PRESCRIBED ANESTHESIA (be asleep for your exam):   Arrive 1 1/2 hours early. Bring someone who can take you home after the test. You may not drive, take a bus or take a taxi by yourself.   No eating 8 hours before your exam. You may have clear liquids up until 4 hours before your exam. (Clear liquids include water, clear tea, black coffee and fruit juice without pulp.)   You will spend four to five hours in the recovery room.  Please call the Imaging Department at your exam site with any questions.            Aug 02, 2018 11:00 AM CDT   (Arrive by 10:45 AM)   Return Visit with Juan Carlos Banda MD   [facility] ([facility])    [address] [id number] [phone]            Aug 10, 2018  9:30 AM CDT   ARMAND Hand with Harriet Lea OT   [facility] ([facility])     [address] [place] [address] [phone]            Aug 10, 2018 10:20 AM CDT   ARMAND Extremity with Nadeem MCCRAY [facility] ([facility])    [address] [place] [id number] [phone]            Aug 17, 2018  9:30 AM CDT   ARMAND Hand with Harriet Lea OT   [facility] ([facility])    [address] [place] [address] [phone]            Aug 17, 2018 10:20 AM CDT   (Arrive by 10:05 AM)   RETURN HAND with Erasmo Borja MD   [facility] ([facility])    [address] [place] [address] [phone]            Aug 17, 2018 11:00 AM CDT   ARMAND Extremity with Nadeem MCCRAY [facility] ([facility])    [address] [place] [id number] [phone]            Aug 24, 2018 10:20 AM CDT   ARMAND Extremity with Nadeem MCCRAY [facility] ([facility])    [address] [place] [id number] [phone]              Who to contact     Please call your clinic at [phone] to:    Ask questions about your health    Make or cancel appointments    Discuss your medicines    Learn about your test results    Speak to your doctor            Additional Information About Your Visit        Envoimoinscherhart Information     Cardio control gives you secure access to your electronic health record. If you see a primary care provider, you can also send messages to your care team and make appointments. If you have questions, please call your primary care clinic.  If you do not have a primary care provider, please call [phone] and they will assist you.      Cardio control is an electronic gateway that provides easy, online access to your medical  "records. With [employer], you can request a clinic appointment, read your test results, renew a prescription or communicate with your care team.     To access your existing account, please contact your [facility] or call [phone] for assistance.        Care EveryWhere ID     This is your Care EveryWhere ID. This could be used by other organizations to access your [place] medical records  [id number]        Your Vitals Were     Height BMI (Body Mass Index)                1.676 m (5' 6\") 24.86 kg/m2           Blood Pressure from Last 3 Encounters:   07/26/18 106/65   07/26/18 107/68   07/23/18 107/68    Weight from Last 3 Encounters:   07/27/18 71.7 kg (158 lb)   07/26/18 72 kg (158 lb 11.2 oz)   07/26/18 69.9 kg (154 lb)              We Performed the Following     PHYSICAL THERAPY REFERRAL (Internal)          Today's Medication Changes          These changes are accurate as of 7/26/18 11:59 PM.  If you have any questions, ask your nurse or doctor.               Start taking these medicines.        Dose/Directions    Vitamin D (Cholecalciferol) 400 units Caps   Used for:  Vitamin D deficiency   Started by:  Izabela Whitman MD        Dose:  800 Units   Take 800 Units by mouth daily   Quantity:  30 capsule   Refills:  3         These medicines have changed or have updated prescriptions.        Dose/Directions    loratadine 10 MG tablet   Commonly known as:  CLARITIN   This may have changed:    - when to take this  - reasons to take this        Dose:  10 mg   Take 1 tablet (10 mg) by mouth daily   Quantity:  30 tablet   Refills:  11         Stop taking these medicines if you haven't already. Please contact your care team if you have questions.     levonorgestrel 20 MCG/24HR IUD   Commonly known as:  MIRENA   Stopped by:  Izabela Whitman MD                Where to get your medicines      These medications were sent to [place] [id number] [place] [address]  [address], [facility] [address]     Phone:  [phone]     Vitamin D (Cholecalciferol) 400 units Caps                Primary Care Provider Office Phone # Fax #    Ruth Ann MckeonhiennbEDY colon JASS [phone] [address]  [place] [address]        Equal Access to Services     [facility]: Hadii aad ku hadasho Soomaali, waaxda luqadaha, qaybta kaalmada adeegyada, waxay idiin hayaan adeeg kharash la'aan . So [facility] [phone].    ATENCIÓN: Si habla español, tiene a laguerre disposición servicios gratuitos de asistencia lingüística. Shaun al [phone].    We comply with applicable federal civil rights laws and [place] laws. We do not discriminate on the basis of race, color, national origin, age, disability, sex, sexual orientation, or gender identity.            Thank you!     Thank you for choosing [facility]  for your care. Our goal is always to provide you with excellent care. Hearing back from our patients is one way we can continue to improve our services. Please take a few minutes to complete the written survey that you may receive in the mail after your visit with us. Thank you!             Your Updated Medication List - Protect others around you: Learn how to safely use, store and throw away your medicines at www.disposemymeds.org.          This list is accurate as of 7/26/18 11:59 PM.  Always use your most recent med list.                   Brand Name Dispense Instructions for use Diagnosis    albuterol 108 (90 Base) MCG/ACT Inhaler    PROAIR HFA    8.5 g    Inhale 1-2 puffs into the lungs every 4 hours as needed for shortness of breath / dyspnea or wheezing    Mast cell disease, systemic, Uncomplicated asthma, unspecified asthma severity, unspecified whether persistent       amitriptyline 10 MG tablet    ELAVIL    30 tablet    Start at 1 tab at bedtime for 3 days, then 2 tabs at bedtime for 3 days, then 3 tabs at bedtime.    Adjustment disorder  with mixed anxiety and depressed mood, Insomnia, unspecified type       EPINEPHrine 0.3 MG/0.3ML injection 2-pack    EPIPEN 2-KOJO    0.3 mL    Inject 0.3 mLs (0.3 mg) into the muscle once as needed for anaphylaxis    Mast cell disease, systemic       fluticasone 50 MCG/ACT spray    FLONASE    1 Bottle    Spray 2 sprays into both nostrils daily    Chronic allergic rhinitis due to animal hair and dander       HYDROcodone-acetaminophen 5-325 MG per tablet    NORCO    8 tablet    Take 1 tablet by mouth every 6 hours as needed for severe pain    Injury of right ring finger, initial encounter       loratadine 10 MG tablet    CLARITIN    30 tablet    Take 1 tablet (10 mg) by mouth daily        MULTIPLE vitamin  S/WOMENS Tabs     100 tablet    Take 1 tablet by mouth daily    Adjustment disorder with mixed anxiety and depressed mood       order for DME     1 Device    Equipment being ordered: Donjoy knee/patella stabilizer brace, right    Right knee pain, EDS (Anton-Danlos syndrome)       paragard intrauterine copper      1 each by Intrauterine route once        Vitamin D (Cholecalciferol) 400 units Caps     30 capsule    Take 800 Units by mouth daily    Vitamin D deficiency

## 2018-07-27 ENCOUNTER — OFFICE VISIT (OUTPATIENT)
Dept: ORTHOPEDICS | Facility: CLINIC | Age: 33
End: 2018-07-27
Payer: MEDICARE

## 2018-07-27 ENCOUNTER — THERAPY VISIT (OUTPATIENT)
Dept: OCCUPATIONAL THERAPY | Facility: CLINIC | Age: 33
End: 2018-07-27
Payer: MEDICARE

## 2018-07-27 VITALS — WEIGHT: 158 LBS | BODY MASS INDEX: 24.8 KG/M2 | HEIGHT: 67 IN

## 2018-07-27 DIAGNOSIS — M25.641 STIFFNESS OF FINGER JOINT OF RIGHT HAND: Primary | ICD-10-CM

## 2018-07-27 DIAGNOSIS — Q79.60 EHLERS-DANLOS SYNDROME: ICD-10-CM

## 2018-07-27 DIAGNOSIS — M79.641 PAIN OF RIGHT HAND: ICD-10-CM

## 2018-07-27 DIAGNOSIS — S63.639A VOLAR PLATE INJURY OF FINGER, INITIAL ENCOUNTER: Primary | ICD-10-CM

## 2018-07-27 PROCEDURE — G8985 CARRY GOAL STATUS: HCPCS | Mod: GO | Performed by: OCCUPATIONAL THERAPIST

## 2018-07-27 PROCEDURE — 97110 THERAPEUTIC EXERCISES: CPT | Mod: GO | Performed by: OCCUPATIONAL THERAPIST

## 2018-07-27 PROCEDURE — 97760 ORTHOTIC MGMT&TRAING 1ST ENC: CPT | Mod: GO | Performed by: OCCUPATIONAL THERAPIST

## 2018-07-27 PROCEDURE — 97165 OT EVAL LOW COMPLEX 30 MIN: CPT | Mod: GO | Performed by: OCCUPATIONAL THERAPIST

## 2018-07-27 PROCEDURE — G8984 CARRY CURRENT STATUS: HCPCS | Mod: GO | Performed by: OCCUPATIONAL THERAPIST

## 2018-07-27 ASSESSMENT — ENCOUNTER SYMPTOMS
STIFFNESS: 1
WEAKNESS: 0
TREMORS: 0
ARTHRALGIAS: 1
TINGLING: 0
LOSS OF CONSCIOUSNESS: 0
EYE REDNESS: 1
POLYDIPSIA: 0
FEVER: 1
MEMORY LOSS: 0
CHILLS: 1
INCREASED ENERGY: 1
BACK PAIN: 1
ALTERED TEMPERATURE REGULATION: 1
SKIN CHANGES: 0
POOR WOUND HEALING: 0
INSOMNIA: 1
DECREASED CONCENTRATION: 1
PANIC: 1
JOINT SWELLING: 1
DECREASED APPETITE: 0
MUSCLE CRAMPS: 1
DIZZINESS: 1
SEIZURES: 0
EYE WATERING: 0
PARALYSIS: 0
HALLUCINATIONS: 0
NERVOUS/ANXIOUS: 1
DOUBLE VISION: 1
BRUISES/BLEEDS EASILY: 1
HEADACHES: 1
FATIGUE: 1
DEPRESSION: 1
SWOLLEN GLANDS: 0
NAIL CHANGES: 0
DISTURBANCES IN COORDINATION: 1
EYE PAIN: 1
NUMBNESS: 0
NIGHT SWEATS: 1
POLYPHAGIA: 1
WEIGHT GAIN: 1
NECK PAIN: 1
SPEECH CHANGE: 0
EYE IRRITATION: 1
WEIGHT LOSS: 0
MYALGIAS: 1
MUSCLE WEAKNESS: 1

## 2018-07-27 NOTE — PROGRESS NOTES
Hand Therapy Initial Evaluation    Current Date:  7/27/2018    Diagnosis:  Right  ring finger volar plate injury and PIP contracture   DOI:  About 6 weeks ago, 6/15/18     Referring MD: Erasmo Borja MD    Next MD visit: 3 weeks      Initial Subjective:  Reba Arboleda is a 32 year old  right  hand dominant female.    Patient reports symptoms of pain, stiffness/loss of motion, edema and weakness/loss of strength of the right ring finger which occurred due to when paddling with a paddle board, flipped off and [place] that accident, [place]. Didn't protect the finger until about a week ago, but took it off and didn't wear that splint much. Since onset symptoms are Gradually getting better.  Special tests:  x-ray.  Previous treatment: none except last week a finger splint per MD.    General health as reported by patient is fair.  Pertinent medical history includes:EDS, Anemia, Asthma, Depression, Heart Problems, Incontinence, Mental Illness, Numbness/Tingling, Osteoarthritis, Overweight, Sleep Disorder/Apnea, Cold/Hot Extremity, Non-Healing Wounds, Persistent Fever/Chills, Severe Headaches  Medical allergies:see EMR.  Surgical history: orthopedic: Left knee,Right menisectomy, left thumb tendon injury post cat bite; other: adenoids, and tonsils out, ear tubes, .  Medication history: Anti-depressants, Pain, Thyroid.  Occupational Profile Information:  Current occupation is [employer]  Currently not working due to EDS, and is just returning to [place] from another state, and studying to be a , health , .  Job Tasks:   Prior functional level:  independent-shared household chores  independent-have help in some areas  Barriers include:none  Mobility: No difficulty  Transportation: drives  Leisure activities/hobbies: studying to be a , caring for daughter.  Additional Occupational Profile Information (patterns of daily living, interests, values and needs):Pt reports difficulty with bathing,  dressing, eating/feeding self, hygiene, toileting, household chores, driving , sports/recreation, pushing, pulling, lifting, carrying and reaching    Functional Outcome Measure:  See flowsheet      Objective:  Observation: Color/Temperature:     [x]    Normal  []    Abnormal    PAIN 7/27/2018     Location Right   ring finger     Description  Aching, Miserable, Sharp, Shooting, Stabbing and Tender     Radiates yes     Worse d/n daytime and nighttime     Frequency intermittant     Exacerbated by Hand use      Relieves cold, heat and rest     At rest 0-10/10 3/10     On use 0-10/10 5/10     At worst.0-10/10 7/10     Sleep disruption?   no     Progression Gradually getting better.         Date: 7/27/2018 7/27/2018   ring finger  AROM  (PROM) Right Left        MCP  /95 WNL   PIP 30/82  (20/x)     DIP  0/32    TROM     DPC flex lag           Strength:  [x]   Contraindicated  Edema:  mild of the  ring finger  Wound/Scar: mild pinkness noted at the radial PIP from previous orthosis  Palpation:  []     Normal        [x]   Tender       []  Mild         [x]   Moderate [x]   Severe   Location: volar plate area, and radial aspect of PIP      Sensation:  WNL throughout all nerve distributions; per patient report      Assessment:   Patient presents with symptoms consistent with above diagnosis,  with non-surgical intervention. Special testing during evaluation indicating what appears to be FDP adhesions in the finger.    Patient's limitations or Problem List includes:  Pain, Decreased ROM/motion, Increased edema, Weakness and Adherence in connective tissue of the right ring finger hand and ring fingerwhich interferes with the patient's ability to perform Self Care Tasks (dressing, eating, bathing, hygiene/toileting),Recreational Activities, Household Chores and Driving  as compared to previous level of function.    Rehab Potential:  Good - Return to full activity, some limitations    Patient will benefit from skilled Occupational  Therapy to increase ROM, flexibility, overall strength,  strength, pinch strength, coordination and dexterity and decrease pain, edema and adherence of scarring to return to previous activity level and resume normal daily tasks and to reach their rehab potential.    Barriers to Learning:  No barrier    Communication Issues:  Patient appears to be able to clearly communicate and understand verbal and written communication and follow directions correctly.  Chart Review: Chart Review, Brief history including review of medical and/or therapy records relating to the presenting problem and Simple history review with patient    Identified Performance Deficits: bathing/showering, toileting, dressing, feeding, hygiene and grooming, care of others, driving and community mobility, home establishment and management, meal preparation and cleanup, sleep, play and leisure activities    Assessment of Occupational Performance:  5 or more Performance Deficits    Clinical Decision Making (Complexity): Low complexity    Treatment Explanation:  The following has been discussed with the patient:  RX ordered/plan of care  Anticipated outcomes  Possible risks and side effects    Treatment Plan:   Frequency:  1 X week, once daily  Duration:  for 8 weeks     Modalities:  US and Paraffin  Therapeutic Exercise:  AROM, AAROM, PROM, Tendon Gliding, Blocking, Reverse Blocking, Isotonics and Isometrics  Neuromuscular re-education:  Coordination/Dexterity, Proprioceptive Training, Kinesiotaping and Stabilization  Manual Techniques:  Scar mobilization and Myofascial release  Self Care:  Ergonomic Considerations  Orthotic Fabrication: Static progressive  finger cast  Discharge Plan:  Achieve all LTG.  Independent in home treatment program.  Reach maximal therapeutic benefit.    Home Exercise Program:  Wear orthosis for between exercises, and at night  DIP blocking in the orthosis    Next Visit:  Progress PIP extension slowly and DIP  flexion/tendon gliding  Progress strengthening when ready, and check on next appt with Dr. Borja, about 3 weeks  Add table tracking when ready

## 2018-07-27 NOTE — LETTER
DEPARTMENT OF HEALTH AND HUMAN SERVICES  [facility] FOR MEDICARE & MEDICAID SERVICES    PLAN/UPDATED PLAN OF PROGRESS FOR OUTPATIENT REHABILITATION  PATIENTS NAME:  Reba Arboleda   : 1985  PR[facility] NUMBER:    [id number]  [id number]    PROVIDER NAME: [facility]  MEDICAL RECORD NUMBER: [id number]   START OF CARE DATE:  SOC Date: 18   TYPE:  PT  PRIMARY/TREATMENT DIAGNOSIS: (Pertinent Medical Diagnosis)  Stiffness of finger joint of right hand  Pain of right hand  Anton-Danlos syndrome  VISITS FROM START OF CARE:  Rxs Used: 1     Hand Therapy Initial Evaluation  Current Date:  2018  Diagnosis:  Right  ring finger volar plate injury and PIP contracture   DOI:  About 6 weeks ago, 6/15/18   Referring MD: Erasmo Borja MD  Next MD visit: 3 weeks    Initial Subjective:  Reba Arboleda is a 32 year old  right  hand dominant female.  Patient reports symptoms of pain, stiffness/loss of motion, edema and weakness/loss of strength of the right ring finger which occurred due to when paddling with a paddle board, flipped off and in that accident, [place]. Didn't protect the finger until about a week ago, but took it off and didn't wear that splint much. Since onset symptoms are Gradually getting better.  Special tests:  x-ray.  Previous treatment: none except last week a finger splint per MD.    General health as reported by patient is fair.  Pertinent medical history includes:EDS, Anemia, Asthma, Depression, Heart Problems, Incontinence, Mental Illness, Numbness/Tingling, Osteoarthritis, Overweight, Sleep Disorder/Apnea, Cold/Hot Extremity, Non-Healing Wounds, Persistent Fever/Chills, Severe Headaches  Medical allergies:see EMR.  Surgical history: orthopedic: Left knee,Right menisectomy, left thumb tendon injury post cat bite; other: adenoids, and tonsils out, ear tubes, .  Medication history: Anti-depressants, Pain, Thyroid.  Occupational Profile Information:  Current  occupation is [employer]  Currently not working due to EDS, and is just returning to [place] from another state, and studying to be a , health , .  Job Tasks:   Prior functional level:  independent-shared household chores  independent-have help in some areas  Barriers include:none  Mobility: No difficulty  Transportation: drives  Leisure activities/hobbies: studying to be a , caring for daughter.  Additional Occupational Profile Information (patterns of daily living, interests, values and needs):Pt reports difficulty with bathing, dressing, eating/feeding self, hygiene, toileting, household chores, driving , sports/recreation, pushing, pulling, lifting, carrying and reaching  Reba Arboleda    Functional Outcome Measure:  See flowsheet    Objective:  Observation: Color/Temperature:     [x]    Normal  []    Abnormal    PAIN 7/27/2018     Location Right   ring finger     Description  Aching, Miserable, Sharp, Shooting, Stabbing and Tender     Radiates yes     Worse d/n daytime and nighttime     Frequency intermittant     Exacerbated by Hand use      Relieves cold, heat and rest     At rest 0-10/10 3/10     On use 0-10/10 5/10     At worst.0-10/10 7/10     Sleep disruption?   no     Progression Gradually getting better.         Date: 7/27/2018 7/27/2018   ring finger  AROM  (PROM) Right Left        MCP  /95 WNL   PIP 30/82  (20/x)     DIP  0/32    TROM     DPC flex lag         Strength:  [x]   Contraindicated  Edema:  mild of the  ring finger  Wound/Scar: mild pinkness noted at the radial PIP from previous orthosis  Palpation:  []     Normal        [x]   Tender       []  Mild         [x]   Moderate [x]   Severe   Location: volar plate area, and radial aspect of PIP      Reba Arboleda    Sensation:  WNL throughout all nerve distributions; per patient report    Assessment:   Patient presents with symptoms consistent with above diagnosis,  with non-surgical intervention. Special testing  during evaluation indicating what appears to be FDP adhesions in the finger.  Patient's limitations or Problem List includes:  Pain, Decreased ROM/motion, Increased edema, Weakness and Adherence in connective tissue of the right ring finger hand and ring fingerwhich interferes with the patient's ability to perform Self Care Tasks (dressing, eating, bathing, hygiene/toileting),Recreational Activities, Household Chores and Driving  as compared to previous level of function.    Rehab Potential:  Good - Return to full activity, some limitations    Patient will benefit from skilled Occupational Therapy to increase ROM, flexibility, overall strength,  strength, pinch strength, coordination and dexterity and decrease pain, edema and adherence of scarring to return to previous activity level and resume normal daily tasks and to reach their rehab potential.    Barriers to Learning:  No barrier    Communication Issues:  Patient appears to be able to clearly communicate and understand verbal and written communication and follow directions correctly.  Chart Review: Chart Review, Brief history including review of medical and/or therapy records relating to the presenting problem and Simple history review with patient    Identified Performance Deficits: bathing/showering, toileting, dressing, feeding, hygiene and grooming, care of others, driving and community mobility, home establishment and management, meal preparation and cleanup, sleep, play and leisure activities    Assessment of Occupational Performance:  5 or more Performance Deficits    Clinical Decision Making (Complexity): Low complexity    Treatment Explanation:  The following has been discussed with the patient:  RX ordered/plan of care  Anticipated outcomes  Possible risks and side effects    Treatment Plan:   Frequency:  1 X week, once daily  Duration:  for 8 weeks  Modalities:  US and Paraffin  Therapeutic Exercise:  AROM, AAROM, PROM, Tendon Gliding, Blocking,  "Reverse Blocking, Isotonics and Isometrics  Neuromuscular re-education:  Coordination/Dexterity, Proprioceptive Training, Kinesiotaping and Stabilization  Manual Techniques:  Scar mobilization and Myofascial release  Self Care:  Ergonomic Considerations  Orthotic Fabrication: Static progressive  finger cast  Discharge Plan:  Achieve all LTG.  Independent in home treatment program.  Reach maximal therapeutic benefit.  Nkechi Saeidlovely    Home Exercise Program:  Wear orthosis for between exercises, and at night  DIP blocking in the orthosis    Next Visit:  Progress PIP extension slowly and DIP flexion/tendon gliding  Progress strengthening when ready, and check on next appt with Dr. Borja, about 3 weeks  Add table tracking when ready        Caregiver Signature/Credentials _____________________________ Date ________          Liliana Meraz, OTD, OTR/L, CHT    I have reviewed and certified the need for these services and plan of treatment while under my care.        PHYSICIAN'S SIGNATURE:   ______________________________________ Date___________                  Erasmo Borja MD    Certification period:  Beginning of Cert date period: 07/27/18 to  End of Cert period date: 10/24/18     Functional Level Progress Report: Please see attached \"Goal Flow sheet for Functional level.\"    ____X____ Continue Services or       ________ DC Services                Service dates: From  SOC Date: 07/27/18 date to present                         "
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
## 2018-07-27 NOTE — MR AVS SNAPSHOT
After Visit Summary   7/27/2018    Reba Arboleda    MRN: [id number]           Patient Information     Date Of Birth          1985        Visit Information        Provider Department      7/27/2018 12:00 PM Liliana Meraz OT [facility]        Today's Diagnoses     Stiffness of finger joint of right hand    -  1    Pain of right hand        Anton-Danlos syndrome           Follow-ups after your visit        Your next 10 appointments already scheduled     Jul 30, 2018  9:50 AM CDT   (Arrive by 9:35 AM)   [facility] Extremity with Cassandra Sky, PT   [facility] ([facility]  )    [address]  [facility] [id number] [phone]            Aug 01, 2018 12:00 PM CDT   ARM[facility] Hand with Harriet Lea OT   [facility] ([facility])    [address]  4th Floor  [facility] [id number] [phone]            Aug 01, 2018  1:45 PM CDT   MR KNEE RIGHT W/O CONTRAST with [id number]   [facility] ([facility])    [address]  1st Floor  [facility] [id number] [phone]           Take your medicines as usual, unless your doctor tells you not to. Bring a list of your current medicines to your exam (including vitamins, minerals and over-the-counter drugs). Also bring the results of similar scans you may have had.  Please remove any body piercings and hair extensions before you arrive.  Follow your doctor s orders. If you do not, we may have to postpone your exam.  You may or may not receive IV contrast for this exam pending the discretion of the Radiologist.  You do not need to do anything special to prepare.  The MRI machine uses a strong magnet. Please wear clothes without metal (snaps, zippers). A sweatsuit works well, or we may give you a hospital gown.   **IMPORTANT** THE INSTRUCTIONS BELOW ARE ONLY FOR THOSE PATIENTS WHO HAVE BEEN PRESCRIBED  SEDATION OR GENERAL ANESTHESIA DURING THEIR MRI PROCEDURE:  IF YOUR DOCTOR PRESCRIBED ORAL SEDATION (take medicine to help you relax during your exam):   You must get the medicine from your doctor (oral medication) before you arrive. Bring the medicine to the exam. Do not take it at home. You ll be told when to take it upon arriving for your exam.   Arrive one hour early. Bring someone who can take you home after the test. Your medicine will make you sleepy. After the exam, you may not drive, take a bus or take a taxi by yourself.  IF YOUR DOCTOR PRESCRIBED IV SEDATION:   Arrive one hour early. Bring someone who can take you home after the test. Your medicine will make you sleepy. After the exam, you may not drive, take a bus or take a taxi by yourself.   No eating 6 hours before your exam. You may have clear liquids up until 4 hours before your exam. (Clear liquids include water, clear tea, black coffee and fruit juice without pulp.)  IF YOUR DOCTOR PRESCRIBED ANESTHESIA (be asleep for your exam):   Arrive 1 1/2 hours early. Bring someone who can take you home after the test. You may not drive, take a bus or take a taxi by yourself.   No eating 8 hours before your exam. You may have clear liquids up until 4 hours before your exam. (Clear liquids include water, clear tea, black coffee and fruit juice without pulp.)   You will spend four to five hours in the recovery room.  Please call the Imaging Department at your exam site with any questions.            Aug 02, 2018 11:00 AM CDT   (Arrive by 10:45 AM)   Return Visit with Juan Carlos Banda MD   [facility] ([facility])    [address] [place] [address]  [address] [facility] [address]   [phone]            Aug 10, 2018  9:30 AM CDT   ARMAND Hand with Harriet Lea OT   [facility] ([facility])    [address] [place] [address]  [address]th [facility] [phone] [phone]            Aug 10, 2018  10:20 AM CDT   ARMAND Extremity with Nadeem MCCRAY [facility] ([facility])    [address] [place] [id number] [phone]            Aug 17, 2018  9:30 AM CDT   ARMAND Hand with Harriet Lea OT   [facility] ([facility])    [address] [place] [id number] [phone]            Aug 17, 2018 10:20 AM CDT   (Arrive by 10:05 AM)   RETURN HAND with Erasmo [facility]ja [facility] ([facility])    [address] [place] [id number] [phone]            Aug 17, 2018 11:00 AM CDT   ARMAND Extremity with Nadeem MCCRAY [facility] ([facility])    [address] [place] [id number] [phone]            Aug 24, 2018 10:20 AM CDT   ARMAND Extremity with Nadeem Cole   [facility] ([facility])    [address] [place] [id number] [phone]              Future tests that were ordered for you today     Open Future Orders        Priority Expected Expires Ordered    Echocardiogram Routine  7/26/2019 7/26/2018    MR Knee Right w/o Contrast Routine  7/26/2019 7/26/2018            Who to contact     If you have questions or need follow up information about today's clinic visit or your schedule please contact [facility] directly at [phone].  Normal or non-critical lab and imaging results will be communicated to you by MyChart, letter or phone within 4 business days after the clinic has received the results. If you do not hear from us within 7 days, please contact the clinic through MyChart or phone. If you have a critical or abnormal lab result, we will notify you by phone as soon as possible.  Submit refill requests through [employer] or call  your pharmacy and they will forward the refill request to us. Please allow 3 business days for your refill to be completed.          Additional Information About Your Visit        MyChart Information     [facility] gives you secure access to your electronic health record. If you see a primary care provider, you can also send messages to your care team and make appointments. If you have questions, please call your primary care clinic.  If you do not have a primary care provider, please call [phone] and they will assist you.        Care EveryWhere ID     This is your Care EveryWhere ID. This could be used by other organizations to access your [place] medical records  [id number]         Blood Pressure from Last 3 Encounters:   07/26/18 106/65   07/26/18 107/68   07/23/18 107/68    Weight from Last 3 Encounters:   07/27/18 71.7 kg (158 lb)   07/26/18 72 kg (158 lb 11.2 oz)   07/26/18 69.9 kg (154 lb)              We Performed the Following     HC OT EVAL, LOW COMPLEXITY     [facility] CERT REPORT     ORTHOTIC MGMT AND TRAINING, EACH 15 MIN     THERAPEUTIC EXERCISES          Today's Medication Changes          These changes are accurate as of 7/27/18  2:52 PM.  If you have any questions, ask your nurse or doctor.               These medicines have changed or have updated prescriptions.        Dose/Directions    loratadine 10 MG tablet   Commonly known as:  CLARITIN   This may have changed:    - when to take this  - reasons to take this        Dose:  10 mg   Take 1 tablet (10 mg) by mouth daily   Quantity:  30 tablet   Refills:  11                Primary Care Provider Office Phone # Fax #    EDY Zacarias [facility] [phone] [address] [place] [address]        Equal Access to Services     EZRA KEYES : Adalberto Aguillon, stacey bills, dexter ortega. So [facility] [phone].    ATENCIÓN: tristian Braxton  a laguerre disposición servicios gratuitos de asistencia lingüística. Shaun moore [phone].    We comply with applicable federal civil rights laws and [place] laws. We do not discriminate on the basis of race, color, national origin, age, disability, sex, sexual orientation, or gender identity.            Thank you!     Thank you for choosing [facility]  for your care. Our goal is always to provide you with excellent care. Hearing back from our patients is one way we can continue to improve our services. Please take a few minutes to complete the written survey that you may receive in the mail after your visit with us. Thank you!             Your Updated Medication List - Protect others around you: Learn how to safely use, store and throw away your medicines at www.disposemymeds.org.          This list is accurate as of 7/27/18  2:52 PM.  Always use your most recent med list.                   Brand Name Dispense Instructions for use Diagnosis    albuterol 108 (90 Base) MCG/ACT Inhaler    PROAIR HFA    8.5 g    Inhale 1-2 puffs into the lungs every 4 hours as needed for shortness of breath / dyspnea or wheezing    Mast cell disease, systemic, Uncomplicated asthma, unspecified asthma severity, unspecified whether persistent       amitriptyline 10 MG tablet    ELAVIL    30 tablet    Start at 1 tab at bedtime for 3 days, then 2 tabs at bedtime for 3 days, then 3 tabs at bedtime.    Adjustment disorder with mixed anxiety and depressed mood, Insomnia, unspecified type       EPINEPHrine 0.3 MG/0.3ML injection 2-pack    EPIPEN 2-KOJO    0.3 mL    Inject 0.3 mLs (0.3 mg) into the muscle once as needed for anaphylaxis    Mast cell disease, systemic       fluticasone 50 MCG/ACT spray    FLONASE    1 Bottle    Spray 2 sprays into both nostrils daily    Chronic allergic rhinitis due to animal hair and dander       HYDROcodone-acetaminophen 5-325 MG per tablet    NORCO    8 tablet    Take 1 tablet by mouth every 6 hours as  needed for severe pain    Injury of right ring finger, initial encounter       loratadine 10 MG tablet    CLARITIN    30 tablet    Take 1 tablet (10 mg) by mouth daily        MULTIPLE vitamin  S/WOMENS Tabs     100 tablet    Take 1 tablet by mouth daily    Adjustment disorder with mixed anxiety and depressed mood       order for DME     1 Device    Equipment being ordered: Donjoy knee/patella stabilizer brace, right    Right knee pain, EDS (Anton-Danlos syndrome)       paragard intrauterine copper      1 each by Intrauterine route once        Vitamin D (Cholecalciferol) 400 units Caps     30 capsule    Take 800 Units by mouth daily    Vitamin D deficiency

## 2018-07-27 NOTE — MR AVS SNAPSHOT
After Visit Summary   7/27/2018    Reba Arboleda    MRN: [id number]           Patient Information     Date Of Birth          1985        Visit Information        Provider Department      7/27/2018 10:10 AM Erasmo Borja MD [facility]        Today's Diagnoses     Volar plate injury of finger, initial encounter    -  1       Follow-ups after your visit        Additional Services     HAND THERAPY Occupational Therapy or Physical Therapy       Hand Therapy Referral                  Your next 10 appointments already scheduled     Aug 10, 2018  9:30 AM CDT   [facility] Hand with BOLA Biswas [facility] ([facility])    [address] [place] [id number] [phone]            Aug 10, 2018 10:20 AM CDT   [facility] Extremity with Nadeem MCCRAY [facility] ([facility])    [address] [place] [id number] [phone]            Aug 17, 2018  9:30 AM CDT   [facility] Hand with BOLA Biswas [facility] ([facility])    [address] [place] [id number] [phone]            Aug 17, 2018 10:20 AM CDT   (Arrive by 10:05 AM)   RETURN HAND with Erasmo Borja MD   [facility] ([facility])    [address] [place] [address] [id number] [phone]            Aug 17, 2018 11:00 AM CDT   [facility] Extremity with Nadeem MCCRAY [facility] ([facility])    [address] [place] [id number] [phone]            Aug 24, 2018  9:30 AM CDT   [facility] Hand with BOLA Monique [facility] ([facility])    [address] [place] [id number] [phone]            Aug 24, 2018 10:20 AM CDT   [facility]  "Extremity with Nadeem MCCRAY [facility] ([facility])    [address] [facility] [address] [phone]            Sep 11, 2018 10:00 AM CDT   (Arrive by 9:45 AM)   Return Visit with Juan Carlos Banda MD   [facility] ([facility])    [address]  [address] [facility] [id number] [phone]              Who to contact     Please call your clinic at [phone] to:    Ask questions about your health    Make or cancel appointments    Discuss your medicines    Learn about your test results    Speak to your doctor            Additional Information About Your Visit        Veeva Information     Veeva gives you secure access to your electronic health record. If you see a primary care provider, you can also send messages to your care team and make appointments. If you have questions, please call your primary care clinic.  If you do not have a primary care provider, please call [phone] and they will assist you.      Veeva is an electronic gateway that provides easy, online access to your medical records. With Veeva, you can request a clinic appointment, read your test results, renew a prescription or communicate with your care team.     To access your existing account, please contact your [facility] or call [phone] for assistance.        Care EveryWhere ID     This is your Care EveryWhere ID. This could be used by other organizations to access your [facility] medical records  [id number]        Your Vitals Were     Height BMI (Body Mass Index)                1.702 m (5' 7\") 24.75 kg/m2           Blood Pressure from Last 3 Encounters:   No data found for BP    Weight from Last 3 Encounters:   No data found for Wt              Today, you had the following     No orders found for display         Today's Medication Changes          These " changes are accurate as of 7/27/18 11:59 PM.  If you have any questions, ask your nurse or doctor.               These medicines have changed or have updated prescriptions.        Dose/Directions    loratadine 10 MG tablet   Commonly known as:  CLARITIN   This may have changed:    - when to take this  - reasons to take this        Dose:  10 mg   Take 1 tablet (10 mg) by mouth daily   Quantity:  30 tablet   Refills:  11                Primary Care Provider Office Phone # Fax #    Ruth Ann MckeonhiennbEDY colon [facility] [phone] [address] [place] [address]        Equal Access to Services     [facility] : Hadii sherry de leon haddaily Sopartha, waaxda sanju, qaybta kaalmada yana, dexter moore . So [facility] [phone].    ATENCIÓN: Si habla español, tiene a laguerre disposición servicios gratuitos de asistencia lingüística. [facility] [phone].    We comply with applicable federal civil rights laws and [place] laws. We do not discriminate on the basis of race, color, national origin, age, disability, sex, sexual orientation, or gender identity.            Thank you!     Thank you for choosing [facility]  for your care. Our goal is always to provide you with excellent care. Hearing back from our patients is one way we can continue to improve our services. Please take a few minutes to complete the written survey that you may receive in the mail after your visit with us. Thank you!             Your Updated Medication List - Protect others around you: Learn how to safely use, store and throw away your medicines at www.disposemymeds.org.          This list is accurate as of 7/27/18 11:59 PM.  Always use your most recent med list.                   Brand Name Dispense Instructions for use Diagnosis    albuterol 108 (90 Base) MCG/ACT Inhaler    PROAIR HFA    8.5 g    Inhale 1-2 puffs into the lungs every 4 hours as needed for shortness of breath / dyspnea  or wheezing    Mast cell disease, systemic, Uncomplicated asthma, unspecified asthma severity, unspecified whether persistent       amitriptyline 10 MG tablet    ELAVIL    30 tablet    Start at 1 tab at bedtime for 3 days, then 2 tabs at bedtime for 3 days, then 3 tabs at bedtime.    Adjustment disorder with mixed anxiety and depressed mood, Insomnia, unspecified type       EPINEPHrine 0.3 MG/0.3ML injection 2-pack    EPIPEN 2-KOJO    0.3 mL    Inject 0.3 mLs (0.3 mg) into the muscle once as needed for anaphylaxis    Mast cell disease, systemic       fluticasone 50 MCG/ACT spray    FLONASE    1 Bottle    Spray 2 sprays into both nostrils daily    Chronic allergic rhinitis due to animal hair and dander       HYDROcodone-acetaminophen 5-325 MG per tablet    NORCO    8 tablet    Take 1 tablet by mouth every 6 hours as needed for severe pain    Injury of right ring finger, initial encounter       loratadine 10 MG tablet    CLARITIN    30 tablet    Take 1 tablet (10 mg) by mouth daily        MULTIPLE vitamin  S/WOMENS Tabs     100 tablet    Take 1 tablet by mouth daily    Adjustment disorder with mixed anxiety and depressed mood       order for DME     1 Device    Equipment being ordered: Donjoy knee/patella stabilizer brace, right    Right knee pain, EDS (Anton-Danlos syndrome)       paragard intrauterine copper      1 each by Intrauterine route once        Vitamin D (Cholecalciferol) 400 units Caps     30 capsule    Take 800 Units by mouth daily    Vitamin D deficiency

## 2018-07-27 NOTE — NURSING NOTE
"Reason For Visit:   Chief Complaint   Patient presents with     Musculoskeletal Problem     Walk-In referral, 4th finger/right hand pain, MRI results     Age: 32 year old    Currently working? No.  Work status?  On disability for Anton-Danlos  Date of injury: 1-2 months ago, flipped off paddleboard at higher speed, in [place] seen in Urgent Care  History of hypermobile fingers    Date of surgery: none    Smoker: Yes, marijuana    Pain Assessment  Patient Currently in Pain: Yes  Primary Pain Location: Hand  Pain Orientation: Right  Pain Descriptors: Sore  Aggravating Factors: Bending    Hand Dominance Evaluation  Hand Dominance: Right     Ht 1.702 m (5' 7\")  Wt 71.7 kg (158 lb)  BMI 24.75 kg/m2                                           "

## 2018-07-30 ENCOUNTER — THERAPY VISIT (OUTPATIENT)
Dept: PHYSICAL THERAPY | Facility: CLINIC | Age: 33
End: 2018-07-30
Payer: MEDICARE

## 2018-07-30 DIAGNOSIS — M25.561 RIGHT KNEE PAIN: Primary | ICD-10-CM

## 2018-07-30 PROCEDURE — G8978 MOBILITY CURRENT STATUS: HCPCS | Mod: GP | Performed by: PHYSICAL THERAPIST

## 2018-07-30 PROCEDURE — G8979 MOBILITY GOAL STATUS: HCPCS | Mod: GP | Performed by: PHYSICAL THERAPIST

## 2018-07-30 PROCEDURE — 97110 THERAPEUTIC EXERCISES: CPT | Mod: GP | Performed by: PHYSICAL THERAPIST

## 2018-07-30 ASSESSMENT — ACTIVITIES OF DAILY LIVING (ADL)
SQUAT: ACTIVITY IS VERY DIFFICULT
GO DOWN STAIRS: ACTIVITY IS FAIRLY DIFFICULT
STAND: ACTIVITY IS MINIMALLY DIFFICULT
LIMPING: THE SYMPTOM AFFECTS MY ACTIVITY MODERATELY
RAW_SCORE: 34
SWELLING: THE SYMPTOM AFFECTS MY ACTIVITY SEVERELY
HOW_WOULD_YOU_RATE_THE_CURRENT_FUNCTION_OF_YOUR_KNEE_DURING_YOUR_USUAL_DAILY_ACTIVITIES_ON_A_SCALE_FROM_0_TO_100_WITH_100_BEING_YOUR_LEVEL_OF_KNEE_FUNCTION_PRIOR_TO_YOUR_INJURY_AND_0_BEING_THE_INABILITY_TO_PERFORM_ANY_OF_YOUR_USUAL_DAILY_ACTIVITIES?: 50
PAIN: THE SYMPTOM AFFECTS MY ACTIVITY SEVERELY
GIVING WAY, BUCKLING OR SHIFTING OF KNEE: THE SYMPTOM AFFECTS MY ACTIVITY SEVERELY
HOW_WOULD_YOU_RATE_THE_OVERALL_FUNCTION_OF_YOUR_KNEE_DURING_YOUR_USUAL_DAILY_ACTIVITIES?: ABNORMAL
KNEE_ACTIVITY_OF_DAILY_LIVING_SCORE: 48.57
KNEEL ON THE FRONT OF YOUR KNEE: ACTIVITY IS VERY DIFFICULT
RISE FROM A CHAIR: ACTIVITY IS SOMEWHAT DIFFICULT
KNEE_ACTIVITY_OF_DAILY_LIVING_SUM: 34
AS_A_RESULT_OF_YOUR_KNEE_INJURY,_HOW_WOULD_YOU_RATE_YOUR_CURRENT_LEVEL_OF_DAILY_ACTIVITY?: ABNORMAL
WEAKNESS: THE SYMPTOM AFFECTS MY ACTIVITY MODERATELY
GO UP STAIRS: ACTIVITY IS SOMEWHAT DIFFICULT
SIT WITH YOUR KNEE BENT: ACTIVITY IS NOT DIFFICULT
WALK: ACTIVITY IS SOMEWHAT DIFFICULT
STIFFNESS: I DO NOT HAVE THE SYMPTOM

## 2018-07-30 NOTE — PROGRESS NOTES
[facility] Initial Evaluation  Subjective:  Patient is a 32 year old female presenting with rehab right knee hpi.   Reba Arboleda is a 32 year old female with a right knee condition.        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     .    Patient reports pain:  Anterior, lateral, medial and posterior (R).    Pain is described as aching and sharp and is intermittent and reported as 8/10 and 10/10.  Associated symptoms:  Loss of strength, edema, catching, locking and buckling/giving out. Pain is worse during the day.  Symptoms are exacerbated by ascending stairs, descending stairs, bending/squatting and activity and relieved by ice, NSAID's, analgesics and bracing/immobilizing.  Since onset symptoms are gradually worsening.  Special tests:  MRI.      General health as reported by patient is fair.  Pertinent medical history includes:  Anemia, asthma, depression, heart problems, overweight and migraines/headaches.        Current occupation is Disabled   .                                    Objective:  System                                                Knee Evaluation:  ROM:    AROM    Hyperextension:  Left:  0    Right: 0  Extension:  Left: 0    Right:  0  Flexion: Left: 155    Right: 155  PROM    Hyperextension: Left: 8    Right:  8  Extension: Left: 0    Right:  0  Flexion: Left: 155    Right:  155      Strength:     Extension:  Left: 4+/5   Pain:      Right: 4+/5   Pain:  Flexion:  Left: 4/5   Pain:       Right: 4/5   Pain:            Palpation:    Left knee tenderness present at:  Biceps Femoral; Semitendinosus and Semembranosus  Right knee tenderness present at:  Lateral Joint Line; Patellar Tendon; Biceps Femoral; Semitendinosus; Patellar Superior and Patellar Inferior      Functional Testing:          Quad:    Single Leg Squat:  Left:        Moderate loss of control and femoral IR  Right:      Painful   Femoral IR and significant loss of control  Bilateral Leg Squat:  Painful rfom 505 ROM - able to perform full squat with 8/10 pain                General     ROS    Assessment/Plan:    Patient is a 32 year old female with right side knee complaints.    Patient has the following significant findings with corresponding treatment plan.                Diagnosis 1:  R chronic knee pain, lateral meniscal tear   Pain -  hot/cold therapy, self management, education, directional preference exercise and home program  Decreased ROM/flexibility - manual therapy, therapeutic exercise and home program  Decreased joint mobility - manual therapy, therapeutic exercise and home program  Decreased strength - therapeutic exercise, therapeutic activities and home program  Edema - self management/home program  Impaired muscle performance - neuro re-education  Decreased function - therapeutic activities    Therapy Evaluation Codes:   1) History comprised of:   Personal factors that impact the plan of care:      Anxiety, Coping style, Overall behavior pattern, Past/current experiences and Work status.    Comorbidity factors that impact the plan of care are:      Asthma, Depression, Heart problems, Migraines/headaches, Numbness/tingling, Osteoarthritis, Overweight and Sleep disorder/apnea.     Medications impacting care: Anti-inflammatory, Muscle relaxant and Pain.  2) Examination of Body Systems comprised of:   Body structures and functions that impact the plan of care:      Knee.   Activity limitations that impact the plan of care are:       Running, Sports, Squatting/kneeling and Stairs.  3) Clinical presentation characteristics are:   Stable/Uncomplicated.  4) Decision-Making    Moderate complexity using standardized patient assessment instrument and/or measureable assessment of functional outcome.  Cumulative Therapy Evaluation is: Low complexity.    Previous and current functional limitations:  (See Goal Flow Sheet for this information)    Short term and Long term goals: (See Goal Flow Sheet for this information)     Communication ability:  Patient appears to be able to clearly communicate and understand verbal and written communication and follow directions correctly.  Treatment Explanation - The following has been discussed with the patient:   RX ordered/plan of care  Anticipated outcomes  Possible risks and side effects  This patient would benefit from PT intervention to resume normal activities.   Rehab potential is good.    Frequency:  1 X week, once daily  Duration:  for 8 weeks  Discharge Plan:  Achieve all LTG.  Independent in home treatment program.  Return to previous functional level by discharge.  Reach maximal therapeutic benefit.    Please refer to the daily flowsheet for treatment today, total treatment time and time spent performing 1:1 timed codes.

## 2018-07-30 NOTE — LETTER
DEPARTMENT OF HEALTH AND HUMAN SERVICES  CENTERS FOR MEDICARE & MEDICAID SERVICES    PLAN/UPDATED PLAN OF PROGRESS FOR OUTPATIENT REHABILITATION    PATIENTS NAME:  Reba Arboleda   : 1985  PROVIDER NUMBER:    [id number]  [id number]  PROVIDER NAME: [facility]YS[facility]  MEDICAL RECORD NUMBER: [id number]   START OF CARE DATE:  SOC Date: 18   TYPE:  PT  PRIMARY/TREATMENT DIAGNOSIS: (Pertinent Medical Diagnosis)  Right knee pain    VISITS FROM START OF CARE:  Rxs Used: 1     [facility] Initial Evaluation  Subjective:  Patient is a 32 year old female presenting with rehab right knee hpi.   Reba Arboleda is a 32 year old female with a right knee condition.        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  Patient reports pain:  Anterior, lateral, medial and posterior (R).    Pain is described as aching and sharp and is intermittent and reported as 8/10 and 10/10.  Associated symptoms:  Loss of strength, edema, catching, locking and buckling/giving out. Pain is worse during the day.  Symptoms are exacerbated by ascending stairs, descending stairs, bending/squatting and activity and relieved by ice, NSAID's, analgesics and bracing/immobilizing.  Since onset symptoms are gradually worsening.  Special tests:  MRI.      General health as reported by patient is fair.  Pertinent medical history includes: Anemia, asthma, depression, heart problems, overweight and  migraines/headaches.      Current occupation is Disabled   Objective:  System  Knee Evaluation:  ROM:    AROM  Hyperextension:  Left:  0    Right: 0  Extension:  Left: 0    Right:  0  Flexion: Left: 155    Right: 155  PROM  Hyperextension: Left: 8    Right:  8  Extension: Left: 0    Right:  0  Flexion: Left: 155    Right:  155  Strength:   Extension:  Left: 4+/5   Pain:      Right: 4+/5   Pain:  Flexion:  Left: 4/5   Pain:      Right: 4/5   Pain:    Palpation:    Left knee tenderness present at:  Biceps Femoral; Semitendinosus and Semembranosus  Right knee tenderness present at:  Lateral Joint Line; Patellar Tendon; Biceps Femoral; Semitendinosus; Patellar Superior and Patellar Inferior  Functional Testing:    Quad:    Single Leg Squat:  Left:        Moderate loss of control and femoral IR  Right:      Painful   Femoral IR and significant loss of control  Bilateral Leg Squat:  Painful rfom 505 ROM - able to perform full squat with 8/10 pain        Assessment/Plan:    Patient is a 32 year old female with right side knee complaints.    Patient has the following significant findings with corresponding treatment plan.                Diagnosis 1:  R chronic knee pain, lateral meniscal tear   Pain -  hot/cold therapy, self management, education, directional preference exercise and home program  Decreased ROM/flexibility - manual therapy, therapeutic exercise and home program  Decreased joint mobility - manual therapy, therapeutic exercise and home program  Decreased strength - therapeutic exercise, therapeutic activities and home program  Edema - self management/home program  Impaired muscle performance - neuro re-education  Decreased function - therapeutic activities    Therapy Evaluation Codes:   1) History comprised of:   Personal factors that impact the plan of care:      Anxiety, Coping style, Overall behavior pattern, Past/current experiences and Work status.    Comorbidity factors that impact the plan of care are:       Asthma, Depression, Heart problems, Migraines/headaches, Numbness/tingling, Osteoarthritis, Overweight and Sleep disorder/apnea.     Medications impacting care: Anti-inflammatory, Muscle relaxant and Pain.  2) Examination of Body Systems comprised of:   Body structures and functions that impact the plan of care:      Knee.   Activity limitations that impact the plan of care are:      Running, Sports, Squatting/kneeling and Stairs.  3) Clinical presentation characteristics are:   Stable/Uncomplicated.  4) Decision-Making    Moderate complexity using standardized patient assessment instrument and/or measureable assessment of functional outcome.  Cumulative Therapy Evaluation is: Low complexity.    Previous and current functional limitations:  (See Goal Flow Sheet for this information)    Short term and Long term goals: (See Goal Flow Sheet for this information)   Communication ability:  Patient appears to be able to clearly communicate and understand verbal and written communication and follow directions correctly.  Treatment Explanation - The following has been discussed with the patient:   RX ordered/plan of care  Anticipated outcomes  Possible risks and side effects  This patient would benefit from PT intervention to resume normal activities.   Rehab potential is good.    Frequency:  1 X week, once daily  Duration:  for 8 weeks  Discharge Plan:  Achieve all LTG.  Independent in home treatment program.  Return to previous functional level by discharge.  Reach maximal therapeutic benefit.    Caregiver Signature/Credentials _____________________________ Date ________       Treating Provider: Cassandra Sky, PT, OCS   I have reviewed and certified the need for these services and plan of treatment while under my care.        PHYSICIAN'S SIGNATURE:   _________________________________________  Date___________   Juan Carlos Banda MD    Certification period:  Beginning of Cert date period: 07/30/18 to  End of Cert period  "date: 10/28/18     Functional Level Progress Report: Please see attached \"Goal Flow sheet for Functional level.\"    ____X____ Continue Services or       ________ DC Services                Service dates: From  SOC Date: 07/30/18 date to present                         "

## 2018-07-30 NOTE — MR AVS SNAPSHOT
After Visit Summary   7/30/2018    Reba Arboleda    MRN: [id number]           Patient Information     Date Of Birth          1985        Visit Information        Provider Department      7/30/2018 9:50 AM Cassandra Sky, PT [facility]        Today's Diagnoses     Right knee pain    -  1       Follow-ups after your visit        Your next 10 appointments already scheduled     Aug 01, 2018 12:00 PM CDT   ARMAND Hand with Harriet Lea OT   [facility] Hand Therapy ([facility])    [address]  4th Floor  [facility] [id number] [phone]            Aug 01, 2018  1:45 PM CDT   MR KNEE RIGHT W/O CONTRAST with [id number]   [facility] MRI ([facility])    [address]  1st Floor  [facility] [id number] [phone]           Take your medicines as usual, unless your doctor tells you not to. Bring a list of your current medicines to your exam (including vitamins, minerals and over-the-counter drugs). Also bring the results of similar scans you may have had.  Please remove any body piercings and hair extensions before you arrive.  Follow your doctor s orders. If you do not, we may have to postpone your exam.  You may or may not receive IV contrast for this exam pending the discretion of the Radiologist.  You do not need to do anything special to prepare.  The MRI machine uses a strong magnet. Please wear clothes without metal (snaps, zippers). A sweatsuit works well, or we may give you a hospital gown.   **IMPORTANT** THE INSTRUCTIONS BELOW ARE ONLY FOR THOSE PATIENTS WHO HAVE BEEN PRESCRIBED SEDATION OR GENERAL ANESTHESIA DURING THEIR MRI PROCEDURE:  IF YOUR DOCTOR PRESCRIBED ORAL SEDATION (take medicine to help you relax during your exam):   You must get the medicine from your doctor (oral medication) before you arrive. Bring the medicine to the exam. Do not take it at home.  You ll be told when to take it upon arriving for your exam.   Arrive one hour early. Bring someone who can take you home after the test. Your medicine will make you sleepy. After the exam, you may not drive, take a bus or take a taxi by yourself.  IF YOUR DOCTOR PRESCRIBED IV SEDATION:   Arrive one hour early. Bring someone who can take you home after the test. Your medicine will make you sleepy. After the exam, you may not drive, take a bus or take a taxi by yourself.   No eating 6 hours before your exam. You may have clear liquids up until 4 hours before your exam. (Clear liquids include water, clear tea, black coffee and fruit juice without pulp.)  IF YOUR DOCTOR PRESCRIBED ANESTHESIA (be asleep for your exam):   Arrive 1 1/2 hours early. Bring someone who can take you home after the test. You may not drive, take a bus or take a taxi by yourself.   No eating 8 hours before your exam. You may have clear liquids up until 4 hours before your exam. (Clear liquids include water, clear tea, black coffee and fruit juice without pulp.)   You will spend four to five hours in the recovery room.  Please call the Imaging Department at your exam site with any questions.            Aug 02, 2018 11:00 AM CDT   (Arrive by 10:45 AM)   Return Visit with Juan Carlos Banda MD   [facility] ([facility])    [address] [place] [id number]   [phone]            Aug 10, 2018  9:30 AM CDT   ARMAND Hand with BOLA Biswas [facility] Hand Therapy ([facility])    [address] [place] [id number] [phone]            Aug 10, 2018 10:20 AM CDT   ARMAND Extremity with Nadeem MCCRAY [facility] Physical Therapy ARMAND ([facility])    [address] [place] [id number] [phone]            Aug 17, 2018  9:30 AM CDT   ARMAND Hand with BOLA Biswas [facility]  Hand Therapy ([facility])    [address] [place] [address]   [phone]            Aug 17, 2018 10:20 AM CDT   (Arrive by 10:05 AM)   RETURN HAND with Erasmo Borja MD   [facility] ([facility])    [address] [place] [phone]   [phone]            Aug 17, 2018 11:00 AM CDT   ARMAND Extremity with Nadeem Cole   [facility] Physical Therapy ARMAND ([facility])    [address] [place] [phone] [phone]            Aug 24, 2018  9:30 AM CDT   ARMAND Hand with Jailyn Quezada OT   [facility] Hand Therapy ([facility])    [address] [place] [id number] [phone]            Aug 24, 2018 10:20 AM CDT   ARMAND Extremity with Nadeem MCCRAY [facility] Physical Therapy AMRAND ([facility])    [address] [place] [id number] [phone]              Who to contact     If you have questions or need follow up information about today's clinic visit or your schedule please contact [facility]ICAL [facility] directly at [phone].  Normal or non-critical lab and imaging results will be communicated to you by [employer], letter or phone within 4 business days after the clinic has received the results. If you do not hear from us within 7 days, please contact the clinic through [employer] or phone. If you have a critical or abnormal lab result, we will notify you by phone as soon as possible.  Submit refill requests through [employer] or call your pharmacy and they will forward the refill request to us. Please allow 3 business days for your refill to be completed.          Additional Information About Your Visit        [employer] Information     [employer] gives you secure access to your electronic health record.  If you see a primary care provider, you can also send messages to your care team and make appointments. If you have questions, please call your primary care clinic.  If you do not have a primary care provider, please call [phone] and they will assist you.        Care EveryWhere ID     This is your Care EveryWhere ID. This could be used by other organizations to access your [facility] medical records  [id number]         Blood Pressure from Last 3 Encounters:   07/26/18 106/65   07/26/18 107/68   07/23/18 107/68    Weight from Last 3 Encounters:   07/27/18 71.7 kg (158 lb)   07/26/18 72 kg (158 lb 11.2 oz)   07/26/18 69.9 kg (154 lb)              We Performed the Following     [facility]AND CERT REPORT     [facility]AND INITIAL EVAL REPORT     THERAPEUTIC EXERCISES          Today's Medication Changes          These changes are accurate as of 7/30/18 10:46 AM.  If you have any questions, ask your nurse or doctor.               These medicines have changed or have updated prescriptions.        Dose/Directions    loratadine 10 MG tablet   Commonly known as:  CLARITIN   This may have changed:    - when to take this  - reasons to take this        Dose:  10 mg   Take 1 tablet (10 mg) by mouth daily   Quantity:  30 tablet   Refills:  11                Primary Care Provider Office Phone # Fax #    EDY Zacarias [phone] [address] [place] [address]        Equal Access to Services     GORDY KEYES AH: Hadii sherry cardozao Sopartha, waaxda luqadaha, qaybta kaalmada adeegyada, dexter zamora. So [facility] [phone].    ATENCIÓN: Si habla español, tiene a laguerre disposición servicios gratuitos de asistencia lingüística. Llame al [phone].    We comply with applicable federal civil rights laws and [place] laws. We do not discriminate on the basis of race, color, national origin, age, disability, sex, sexual orientation, or gender identity.            Thank you!      Thank you for choosing [facility]YS[facility]ERAP[facility]  for your care. Our goal is always to provide you with excellent care. Hearing back from our patients is one way we can continue to improve our services. Please take a few minutes to complete the written survey that you may receive in the mail after your visit with us. Thank you!             Your Updated Medication List - Protect others around you: Learn how to safely use, store and throw away your medicines at www.disposemymeds.org.          This list is accurate as of 7/30/18 10:46 AM.  Always use your most recent med list.                   Brand Name Dispense Instructions for use Diagnosis    albuterol 108 (90 Base) MCG/ACT Inhaler    PROAIR HFA    8.5 g    Inhale 1-2 puffs into the lungs every 4 hours as needed for shortness of breath / dyspnea or wheezing    Mast cell disease, systemic, Uncomplicated asthma, unspecified asthma severity, unspecified whether persistent       amitriptyline 10 MG tablet    ELAVIL    30 tablet    Start at 1 tab at bedtime for 3 days, then 2 tabs at bedtime for 3 days, then 3 tabs at bedtime.    Adjustment disorder with mixed anxiety and depressed mood, Insomnia, unspecified type       EPINEPHrine 0.3 MG/0.3ML injection 2-pack    EPIPEN 2-KOJO    0.3 mL    Inject 0.3 mLs (0.3 mg) into the muscle once as needed for anaphylaxis    Mast cell disease, systemic       fluticasone 50 MCG/ACT spray    FLONASE    1 Bottle    Spray 2 sprays into both nostrils daily    Chronic allergic rhinitis due to animal hair and dander       HYDROcodone-acetaminophen 5-325 MG per tablet    NORCO    8 tablet    Take 1 tablet by mouth every 6 hours as needed for severe pain    Injury of right ring finger, initial encounter       loratadine 10 MG tablet    CLARITIN    30 tablet    Take 1 tablet (10 mg) by mouth daily        MULTIPLE vitamin  S/WOMENS Tabs     100 tablet    Take 1 tablet by mouth daily    Adjustment disorder  with mixed anxiety and depressed mood       order for DME     1 Device    Equipment being ordered: Donjoy knee/patella stabilizer brace, right    Right knee pain, EDS (Anton-Danlos syndrome)       paragard intrauterine copper      1 each by Intrauterine route once        Vitamin D (Cholecalciferol) 400 units Caps     30 capsule    Take 800 Units by mouth daily    Vitamin D deficiency

## 2018-08-01 ENCOUNTER — THERAPY VISIT (OUTPATIENT)
Dept: OCCUPATIONAL THERAPY | Facility: CLINIC | Age: 33
End: 2018-08-01
Payer: MEDICARE

## 2018-08-01 ENCOUNTER — RADIANT APPOINTMENT (OUTPATIENT)
Dept: MRI IMAGING | Facility: CLINIC | Age: 33
End: 2018-08-01
Attending: FAMILY MEDICINE
Payer: MEDICARE

## 2018-08-01 DIAGNOSIS — Q79.60 EHLERS-DANLOS SYNDROME: ICD-10-CM

## 2018-08-01 DIAGNOSIS — M25.641 STIFFNESS OF FINGER JOINT OF RIGHT HAND: ICD-10-CM

## 2018-08-01 DIAGNOSIS — M22.41 CHONDROMALACIA OF RIGHT PATELLA: ICD-10-CM

## 2018-08-01 DIAGNOSIS — M79.641 PAIN OF RIGHT HAND: ICD-10-CM

## 2018-08-01 PROCEDURE — 29086 APPLICATION CAST FINGER: CPT | Mod: GO | Performed by: OCCUPATIONAL THERAPIST

## 2018-08-02 ENCOUNTER — OFFICE VISIT (OUTPATIENT)
Dept: ORTHOPEDICS | Facility: CLINIC | Age: 33
End: 2018-08-02
Payer: MEDICARE

## 2018-08-02 DIAGNOSIS — M22.41 CHONDROMALACIA OF RIGHT PATELLA: ICD-10-CM

## 2018-08-02 DIAGNOSIS — Z87.828 H/O TEAR OF MENISCUS OF KNEE JOINT: Primary | ICD-10-CM
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
## 2018-08-17 ENCOUNTER — OFFICE VISIT (OUTPATIENT)
Dept: ORTHOPEDICS | Facility: CLINIC | Age: 33
End: 2018-08-17
Payer: MEDICARE

## 2018-08-17 ENCOUNTER — THERAPY VISIT (OUTPATIENT)
Dept: PHYSICAL THERAPY | Facility: CLINIC | Age: 33
End: 2018-08-17
Payer: MEDICARE

## 2018-08-17 ENCOUNTER — THERAPY VISIT (OUTPATIENT)
Dept: OCCUPATIONAL THERAPY | Facility: CLINIC | Age: 33
End: 2018-08-17
Payer: MEDICARE

## 2018-08-17 DIAGNOSIS — M25.562 BILATERAL KNEE PAIN: Primary | ICD-10-CM

## 2018-08-17 DIAGNOSIS — M25.561 BILATERAL KNEE PAIN: Primary | ICD-10-CM

## 2018-08-17 DIAGNOSIS — Q79.60 EHLERS-DANLOS SYNDROME: ICD-10-CM

## 2018-08-17 DIAGNOSIS — S63.639D VOLAR PLATE INJURY OF FINGER, SUBSEQUENT ENCOUNTER: Primary | ICD-10-CM

## 2018-08-17 DIAGNOSIS — M25.641 STIFFNESS OF FINGER JOINT OF RIGHT HAND: ICD-10-CM

## 2018-08-17 DIAGNOSIS — M79.641 PAIN OF RIGHT HAND: ICD-10-CM

## 2018-08-17 PROCEDURE — 97110 THERAPEUTIC EXERCISES: CPT | Mod: GO | Performed by: OCCUPATIONAL THERAPIST

## 2018-08-17 PROCEDURE — 97110 THERAPEUTIC EXERCISES: CPT | Mod: GP | Performed by: PHYSICAL THERAPIST

## 2018-08-17 PROCEDURE — 97763 ORTHC/PROSTC MGMT SBSQ ENC: CPT | Mod: GO | Performed by: OCCUPATIONAL THERAPIST

## 2018-08-17 NOTE — LETTER
8/17/2018       RE: Reba Arboleda  [address]  [facility] [address]     Dear Colleague,    Thank you for referring your patient, Reba Arboleda, to the [facility]. Please see a copy of my visit note below.    Follow-up right ring finger.  In therapy.  Doing well.  No further injuries.  They are serially splinting the finger.  Less of a contracture now.    The past medical history was reviewed and documented in the chart.  This includes medications, surgeries, social history as well as review of systems.    Examination of the right ring finger demonstrates a 15  flexion contracture at the PIP joint.  She has full flexion though.  Extensors are fully functional as are the flexors, FDS and FDP.  No motor, no sensory deficits are noted.  No significant atrophy.  There is brisk capillary refill in all digits and a palpable radial pulse.  No overlying skin changes noted.    Impression: Right ring finger volar plate tear, stiffness    Plan: She is improving.  Continue with splinting, continue with home exercise program.  We reviewed stretches on working on getting the PIP joint out to full extension.  I will plan on seeing her back in 1 month, although she may be moving to [place] in the next couple of weeks.  If so she can follow-up in that area.  She will continue with her therapy in the meantime here until she leaves.  She does have our contact information though if any problems arise.    Again, thank you for allowing me to participate in the care of your patient.      Sincerely,    Erasmo Borja MD

## 2018-08-17 NOTE — PROGRESS NOTES
Follow-up right ring finger.  In therapy.  Doing well.  No further injuries.  They are serially splinting the finger.  Less of a contracture now.    The past medical history was reviewed and documented in the chart.  This includes medications, surgeries, social history as well as review of systems.    Examination of the right ring finger demonstrates a 15  flexion contracture at the PIP joint.  She has full flexion though.  Extensors are fully functional as are the flexors, FDS and FDP.  No motor, no sensory deficits are noted.  No significant atrophy.  There is brisk capillary refill in all digits and a palpable radial pulse.  No overlying skin changes noted.    Impression: Right ring finger volar plate tear, stiffness    Plan: She is improving.  Continue with splinting, continue with home exercise program.  We reviewed stretches on working on getting the PIP joint out to full extension.  I will plan on seeing her back in 1 month, although she may be moving to [place] in the next couple of weeks.  If so she can follow-up in that area.  She will continue with her therapy in the meantime here until she leaves.  She does have our contact information though if any problems arise.

## 2018-08-17 NOTE — MR AVS SNAPSHOT
After Visit Summary   8/17/2018    Reba Arboleda    MRN: [id number]           Patient Information     Date Of Birth          1985        Visit Information        Provider Department      8/17/2018 10:20 AM Erasmo Borja MD [facility]        Today's Diagnoses     Volar plate injury of finger, subsequent encounter    -  1       Follow-ups after your visit        Your next 10 appointments already scheduled     Aug 17, 2018 11:00 AM CDT   ARMAND Extremity with Nadeem Cole   [facility] Physical Therapy ARM[facility] ([facility])    [address] [place] [address] [id number] [phone]            Aug 24, 2018  9:30 AM CDT   ARMAND Hand with Jailyn Quezada OT   [facility] Hand Therapy ([facility])    [address] [place] [address] [id number] [phone]            Aug 24, 2018 10:20 AM CDT   ARMAND Extremity with Nadeem MCCRAY [facility] ([facility])    [address] [place] [address] [id number] [phone]            Sep 11, 2018 10:00 AM CDT   (Arrive by 9:45 AM)   Return Visit with Juan Carlos Banda MD   [facility] Sports Medicine ([facility])    [address] [place] [address] [id number] [phone]              Who to contact     Please call your clinic at [phone] to:    Ask questions about your health    Make or cancel appointments    Discuss your medicines    Learn about your test results    Speak to your doctor            Additional Information About Your Visit        MyChart Information     [employer] gives you secure access to your electronic health record. If you see a primary care provider, you can also send messages to your care team and make appointments. If you have questions, please call your primary care clinic.  If you do not have a primary care provider, please  call [phone] and they will assist you.      [employer] is an electronic gateway that provides easy, online access to your medical records. With [employer], you can request a clinic appointment, read your test results, renew a prescription or communicate with your care team.     To access your existing account, please contact your [facility] or call [phone] for assistance.        Care EveryWhere ID     This is your Care EveryWhere ID. This could be used by other organizations to access your [place] medical records  [id number]         Blood Pressure from Last 3 Encounters:   07/26/18 106/65   07/26/18 107/68   07/23/18 107/68    Weight from Last 3 Encounters:   07/27/18 71.7 kg (158 lb)   07/26/18 72 kg (158 lb 11.2 oz)   07/26/18 69.9 kg (154 lb)              Today, you had the following     No orders found for display         Today's Medication Changes          These changes are accurate as of 8/17/18 10:39 AM.  If you have any questions, ask your nurse or doctor.               These medicines have changed or have updated prescriptions.        Dose/Directions    loratadine 10 MG tablet   Commonly known as:  CLARITIN   This may have changed:    - when to take this  - reasons to take this        Dose:  10 mg   Take 1 tablet (10 mg) by mouth daily   Quantity:  30 tablet   Refills:  11                Primary Care Provider Office Phone # Fax #    EDY Zacarias [facility] [phone] [address] [place] [address]        Equal Access to Services     GORDY KEYES AH: Hadii sherry ku hadasho Soomaali, waaxda luqadaha, qaybta kaalmada adeegyada, waxay krunal zamora. So [facility] [phone].    ATENCIÓN: Si habla español, tiene a laguerre disposición servicios gratuitos de asistencia lingüística. Llame al [phone].    We comply with applicable federal civil rights laws and [place] laws. We do not discriminate on the basis of race,  color, national origin, age, disability, sex, sexual orientation, or gender identity.            Thank you!     Thank you for choosing [facility]  for your care. Our goal is always to provide you with excellent care. Hearing back from our patients is one way we can continue to improve our services. Please take a few minutes to complete the written survey that you may receive in the mail after your visit with us. Thank you!             Your Updated Medication List - Protect others around you: Learn how to safely use, store and throw away your medicines at www.disposemymeds.org.          This list is accurate as of 8/17/18 10:39 AM.  Always use your most recent med list.                   Brand Name Dispense Instructions for use Diagnosis    albuterol 108 (90 Base) MCG/ACT inhaler    PROAIR HFA    8.5 g    Inhale 1-2 puffs into the lungs every 4 hours as needed for shortness of breath / dyspnea or wheezing    Mast cell disease, systemic, Uncomplicated asthma, unspecified asthma severity, unspecified whether persistent       amitriptyline 10 MG tablet    ELAVIL    30 tablet    Start at 1 tab at bedtime for 3 days, then 2 tabs at bedtime for 3 days, then 3 tabs at bedtime.    Adjustment disorder with mixed anxiety and depressed mood, Insomnia, unspecified type       EPINEPHrine 0.3 MG/0.3ML injection 2-pack    EPIPEN 2-KOJO    0.3 mL    Inject 0.3 mLs (0.3 mg) into the muscle once as needed for anaphylaxis    Mast cell disease, systemic       fluticasone 50 MCG/ACT spray    FLONASE    1 Bottle    Spray 2 sprays into both nostrils daily    Chronic allergic rhinitis due to animal hair and dander       HYDROcodone-acetaminophen 5-325 MG per tablet    NORCO    8 tablet    Take 1 tablet by mouth every 6 hours as needed for severe pain    Injury of right ring finger, initial encounter       loratadine 10 MG tablet    CLARITIN    30 tablet    Take 1 tablet (10 mg) by mouth daily        MULTIPLE vitamin  S/WOMENS  Tabs     100 tablet    Take 1 tablet by mouth daily    Adjustment disorder with mixed anxiety and depressed mood       order for DME     1 Device    Equipment being ordered: Donjoy knee/patella stabilizer brace, right    Right knee pain, EDS (Anton-Danlos syndrome)       paragard intrauterine copper      1 each by Intrauterine route once        Vitamin D (Cholecalciferol) 400 units Caps     30 capsule    Take 800 Units by mouth daily    Vitamin D deficiency

## 2018-08-17 NOTE — PROGRESS NOTES
SOAP note objective information for 8/17/2018.    Please refer to the daily flowsheet for treatment today, total treatment time and time spent performing 1:1 timed codes.       Objective:  Observation: Color/Temperature:     [x]    Normal  []    Abnormal    PAIN 7/27/2018     Location Right   ring finger     Description  Aching, Miserable, Sharp, Shooting, Stabbing and Tender     Radiates yes     Worse d/n daytime and nighttime     Frequency intermittant     Exacerbated by Hand use      Relieves cold, heat and rest     At rest 0-10/10 3/10     On use 0-10/10 5/10     At worst.0-10/10 7/10     Sleep disruption?   no     Progression Gradually getting better.         Date: 7/27/2018 7/27/2018 8/17/18   ring finger  AROM  (PROM) Right Left Right         MCP  /95 WNL /95   PIP 30/82  (20/x)   26/95  (20/x)   DIP  0/32  /65   TROM      DPC flex lag            Strength:  [x]   Contraindicated  Edema:  mild of the  ring finger  Wound/Scar: mild pinkness noted at the radial PIP from previous orthosis  Palpation:  []     Normal        [x]   Tender       []  Mild         [x]   Moderate [x]   Severe   Location: volar plate area, and radial aspect of PIP      Sensation:  WNL throughout all nerve distributions; per patient report    Home Exercise Program:  Wear orthosis for between exercises, and at night  DIP blocking in the orthosis  8/17  Extensor tracking with very gentle pressure over top of PIP    Next Visit:  Progress PIP extension slowly and DIP flexion/tendon gliding  Progress strengthening when ready, and check on appt w/ Dr. Borja

## 2018-08-17 NOTE — MR AVS SNAPSHOT
After Visit Summary   8/17/2018    Reba Arboleda    MRN: [id number]           Patient Information     Date Of Birth          1985        Visit Information        Provider Department      8/17/2018 9:30 AM Harriet Lea OT [facility]        Today's Diagnoses     Stiffness of finger joint of right hand        Pain of right hand        Anton-Danlos syndrome           Follow-ups after your visit        Your next 10 appointments already scheduled     Aug 24, 2018  9:30 AM CDT   ARMAND Hand with BOLA Monique [facility] Hand Therapy ([facility])    [address] [place] [address] [id number] [phone]            Aug 24, 2018 10:20 AM CDT   ARMAND Extremity with Nadeem Cole   [facility] Physical [facility] ([facility])    [address] [place] [address] [id number] [phone]            Sep 11, 2018 10:00 AM CDT   (Arrive by 9:45 AM)   Return Visit with Juan Carlos Banda MD   [facility] Sports Medicine ([facility])    [address] [place] [id number] [phone]              Who to contact     If you have questions or need follow up information about today's clinic visit or your schedule please contact [facility] directly at [phone].  Normal or non-critical lab and imaging results will be communicated to you by MyChart, letter or phone within 4 business days after the clinic has received the results. If you do not hear from us within 7 days, please contact the clinic through MyChart or phone. If you have a critical or abnormal lab result, we will notify you by phone as soon as possible.  Submit refill requests through [employer] or call your pharmacy and they will forward the refill request to us. Please allow 3 business days for your refill to be completed.          Additional Information About Your Visit        [facility]  Information     Man gives you secure access to your electronic health record. If you see a primary care provider, you can also send messages to your care team and make appointments. If you have questions, please call your primary care clinic.  If you do not have a primary care provider, please call [phone] and they will assist you.        Care EveryWhere ID     This is your Care EveryWhere ID. This could be used by other organizations to access your [place] medical records  [id number]         Blood Pressure from Last 3 Encounters:   07/26/18 106/65   07/26/18 107/68   07/23/18 107/68    Weight from Last 3 Encounters:   07/27/18 71.7 kg (158 lb)   07/26/18 72 kg (158 lb 11.2 oz)   07/26/18 69.9 kg (154 lb)              We Performed the Following     C OT ORTHOTICS/PROSTH MGMT &/TRAING SBSQ ENCTR, EA 15 MIN     THERAPEUTIC EXERCISES          Today's Medication Changes          These changes are accurate as of 8/17/18 11:48 AM.  If you have any questions, ask your nurse or doctor.               These medicines have changed or have updated prescriptions.        Dose/Directions    loratadine 10 MG tablet   Commonly known as:  CLARITIN   This may have changed:    - when to take this  - reasons to take this        Dose:  10 mg   Take 1 tablet (10 mg) by mouth daily   Quantity:  30 tablet   Refills:  11                Primary Care Provider Office Phone # Fax #    EDY Zacarias [facility] [phone] [address] [place] [address]        Equal Access to Services     GORDY KEYES AH: Hadii sherry ku hadasho Soomaali, waaxda luqadaha, qaybta kaalmada adeegyada, dexter zamora. So [facility] [phone].    ATENCIÓN: Si habla español, tiene a laguerre disposición servicios gratuitos de asistencia lingüística. Llame al [phone].    We comply with applicable federal civil rights laws and [place] laws. We do not discriminate on the basis of race, color,  national origin, age, disability, sex, sexual orientation, or gender identity.            Thank you!     Thank you for choosing [facility]  for your care. Our goal is always to provide you with excellent care. Hearing back from our patients is one way we can continue to improve our services. Please take a few minutes to complete the written survey that you may receive in the mail after your visit with us. Thank you!             Your Updated Medication List - Protect others around you: Learn how to safely use, store and throw away your medicines at www.disposemymeds.org.          This list is accurate as of 8/17/18 11:48 AM.  Always use your most recent med list.                   Brand Name Dispense Instructions for use Diagnosis    albuterol 108 (90 Base) MCG/ACT inhaler    PROAIR HFA    8.5 g    Inhale 1-2 puffs into the lungs every 4 hours as needed for shortness of breath / dyspnea or wheezing    Mast cell disease, systemic, Uncomplicated asthma, unspecified asthma severity, unspecified whether persistent       amitriptyline 10 MG tablet    ELAVIL    30 tablet    Start at 1 tab at bedtime for 3 days, then 2 tabs at bedtime for 3 days, then 3 tabs at bedtime.    Adjustment disorder with mixed anxiety and depressed mood, Insomnia, unspecified type       EPINEPHrine 0.3 MG/0.3ML injection 2-pack    EPIPEN 2-KOJO    0.3 mL    Inject 0.3 mLs (0.3 mg) into the muscle once as needed for anaphylaxis    Mast cell disease, systemic       fluticasone 50 MCG/ACT spray    FLONASE    1 Bottle    Spray 2 sprays into both nostrils daily    Chronic allergic rhinitis due to animal hair and dander       HYDROcodone-acetaminophen 5-325 MG per tablet    NORCO    8 tablet    Take 1 tablet by mouth every 6 hours as needed for severe pain    Injury of right ring finger, initial encounter       loratadine 10 MG tablet    CLARITIN    30 tablet    Take 1 tablet (10 mg) by mouth daily        MULTIPLE vitamin  S/WOMENS Tabs      100 tablet    Take 1 tablet by mouth daily    Adjustment disorder with mixed anxiety and depressed mood       order for DME     1 Device    Equipment being ordered: Donjoy knee/patella stabilizer brace, right    Right knee pain, EDS (Anton-Danlos syndrome)       paragard intrauterine copper      1 each by Intrauterine route once        Vitamin D (Cholecalciferol) 400 units Caps     30 capsule    Take 800 Units by mouth daily    Vitamin D deficiency

## 2018-08-17 NOTE — NURSING NOTE
Reason For Visit:   Chief Complaint   Patient presents with     RECHECK     Right ring finger volar plate injury           Pain Assessment  Patient Currently in Pain: Yes  0-10 Pain Scale: 2  Primary Pain Location:  (ring finger)  Pain Orientation: Right  Pain Descriptors: Constant  Alleviating Factors: Rest  Aggravating Factors: Bending (touching)    Hand Dominance Evaluation  Hand Dominance: Right

## 2018-08-24 ENCOUNTER — OFFICE VISIT (OUTPATIENT)
Dept: DERMATOLOGY | Facility: CLINIC | Age: 33
End: 2018-08-24
Payer: MEDICARE

## 2018-08-24 ENCOUNTER — THERAPY VISIT (OUTPATIENT)
Dept: OCCUPATIONAL THERAPY | Facility: CLINIC | Age: 33
End: 2018-08-24
Payer: MEDICARE

## 2018-08-24 DIAGNOSIS — Q79.60 EHLERS-DANLOS SYNDROME: ICD-10-CM

## 2018-08-24 DIAGNOSIS — M79.641 PAIN OF RIGHT HAND: ICD-10-CM

## 2018-08-24 DIAGNOSIS — L70.0 ACNE VULGARIS: Primary | ICD-10-CM

## 2018-08-24 DIAGNOSIS — M25.641 STIFFNESS OF FINGER JOINT OF RIGHT HAND: Primary | ICD-10-CM

## 2018-08-24 PROCEDURE — 97110 THERAPEUTIC EXERCISES: CPT | Mod: GO | Performed by: OCCUPATIONAL THERAPIST

## 2018-08-24 PROCEDURE — 97763 ORTHC/PROSTC MGMT SBSQ ENC: CPT | Mod: GO | Performed by: OCCUPATIONAL THERAPIST

## 2018-08-24 RX ORDER — TRETINOIN 0.25 MG/G
CREAM TOPICAL
Qty: 45 G | Refills: 1 | Status: SHIPPED | OUTPATIENT
Start: 2018-08-24

## 2018-08-24 RX ORDER — SULFACETAMIDE SODIUM, SULFUR 100; 50 MG/G; MG/G
LOTION TOPICAL
Qty: 60 G | Refills: 1 | Status: SHIPPED | OUTPATIENT
Start: 2018-08-24

## 2018-08-24 ASSESSMENT — PAIN SCALES - GENERAL: PAINLEVEL: MODERATE PAIN (4)

## 2018-08-24 NOTE — MR AVS SNAPSHOT
After Visit Summary   8/24/2018    Reba Arboleda    MRN: [id number]           Patient Information     Date Of Birth          1985        Visit Information        Provider Department      8/24/2018 9:00 AM Jigna Norwood PA-C [facility] D[facility]        Today's Diagnoses     Acne vulgaris    -  1      Care Instructions    BPO cleanser          Follow-ups after your visit        Follow-up notes from your care team     Return in about 3 months (around 11/24/2018).      Your next 10 appointments already scheduled     Aug 31, 2018 11:40 AM CDT   [facility] Extremity with Nadeem MCCRAY [facility] ([facility])    [address] [place] [id number] [phone]            Aug 31, 2018  1:00 PM CDT   [facility] Hand with Liliana Meraz OT   [facility] Hand Therapy ([facility])    [address] [place] [id number] [phone]            Sep 07, 2018 12:00 PM CDT   [facility] Hand with Harriet Lea OT   [facility] ([facility])    [address] [place] [id number] [phone]            Sep 07, 2018  1:00 PM CDT   ARM[facility] Extremity with Nadeem MCCRAY [facility] ([facility])    [address] [place] [id number] [phone]            Sep 11, 2018 10:00 AM CDT   (Arrive by 9:45 AM)   Return Visit with Juan Carlos Banda MD   [facility] Medicine ([facility])    [address] [place] [id number]   [phone]            Sep 11, 2018 10:30 AM CDT   [facility] Hand with Harriet Lea OT   [facility] ([facility])    [address] [place] [phone]   [phone]            Sep 11, 2018 12:50 PM CDT   [facility] Extremity with Jose Perez PT   [facility] ([facility])    [address] 5th Floor  [facility] [id number] [phone]            Nov 27, 2018  1:30 PM CST   (Arrive by 1:15 PM)   Return Visit with ROCKY Duran [facility] ([facility])    [address]  3rd Floor  [facility] [id number] [phone]              Who to contact     Please call your clinic at [phone] to:    Ask questions about your health    Make or cancel appointments    Discuss your medicines    Learn about your test results    Speak to your doctor            Additional Information About Your Visit        Akvo Information     Akvo gives you secure access to your electronic health record. If you see a primary care provider, you can also send messages to your care team and make appointments. If you have questions, please call your primary care clinic.  If you do not have a primary care provider, please call [phone] and they will assist you.      Akvo is an electronic gateway that provides easy, online access to your medical records. With [employer], you can request a clinic appointment, read your test results, renew a prescription or communicate with your care team.     To access your existing account, please contact your [facility] or call [phone] for assistance.        Care EveryWhere ID     This is your Care EveryWhere ID. This could be used by other organizations to access your [place] medical records  [id number]         Blood Pressure from Last 3 Encounters:   07/26/18 106/65   07/26/18 107/68   07/23/18 107/68    Weight from Last 3 Encounters:   07/27/18 71.7 kg (158 lb)   07/26/18 72 kg (158 lb 11.2 oz)   07/26/18 69.9 kg (154 lb)              Today, you had the following     No orders found for display         Today's Medication Changes          These changes are accurate as of 8/24/18 10:48 AM.  If you  have any questions, ask your nurse or doctor.               Start taking these medicines.        Dose/Directions    benzoyl peroxide 5 % Liqd   Used for:  Acne vulgaris   Started by:  Jigna Norwood PA-C        Use daily as directed   Quantity:  140 g   Refills:  3       sulfacetamide sodium-sulfur 10-5 % Lotn   Used for:  Acne vulgaris   Started by:  Jigna Norwood PA-C        Use daily as directed   Quantity:  60 g   Refills:  1       tretinoin 0.025 % cream   Commonly known as:  RETIN-A   Used for:  Acne vulgaris   Started by:  Jigna Norwood PA-C        Use every night   Quantity:  45 g   Refills:  1         These medicines have changed or have updated prescriptions.        Dose/Directions    loratadine 10 MG tablet   Commonly known as:  CLARITIN   This may have changed:    - when to take this  - reasons to take this        Dose:  10 mg   Take 1 tablet (10 mg) by mouth daily   Quantity:  30 tablet   Refills:  11            Where to get your medicines      These medications were sent to [id number] [facility] - [place], [place] - [address]  [address] [place] [address] [address] , [place] [address]     Phone:  [phone]     benzoyl peroxide 5 % Liqd    sulfacetamide sodium-sulfur 10-5 % Lotn    tretinoin 0.025 % cream                Primary Care Provider Office Phone # Fax #    EDY Zacarias [phone] [address] [place] [address]        Equal Access to Services     GORDY KEYES AH: Hadii sherry cardozao Sopartha, waaxda luqadaha, qaybta kaalmada adeegyada, dexter zamora. So [facility] [phone].    ATENCIÓN: Si habla español, tiene a laguerre disposición servicios gratuitos de asistencia lingüística. Shaun al [phone].    We comply with applicable federal civil rights laws and [place] laws. We do not discriminate on the basis of race, color, national origin, age, disability, sex, sexual orientation, or gender  identity.            Thank you!     Thank you for choosing [facility]  for your care. Our goal is always to provide you with excellent care. Hearing back from our patients is one way we can continue to improve our services. Please take a few minutes to complete the written survey that you may receive in the mail after your visit with us. Thank you!             Your Updated Medication List - Protect others around you: Learn how to safely use, store and throw away your medicines at www.disposemymeds.org.          This list is accurate as of 8/24/18 10:48 AM.  Always use your most recent med list.                   Brand Name Dispense Instructions for use Diagnosis    albuterol 108 (90 Base) MCG/ACT inhaler    PROAIR HFA    8.5 g    Inhale 1-2 puffs into the lungs every 4 hours as needed for shortness of breath / dyspnea or wheezing    Mast cell disease, systemic, Uncomplicated asthma, unspecified asthma severity, unspecified whether persistent       amitriptyline 10 MG tablet    ELAVIL    30 tablet    Start at 1 tab at bedtime for 3 days, then 2 tabs at bedtime for 3 days, then 3 tabs at bedtime.    Adjustment disorder with mixed anxiety and depressed mood, Insomnia, unspecified type       benzoyl peroxide 5 % Liqd     140 g    Use daily as directed    Acne vulgaris       EPINEPHrine 0.3 MG/0.3ML injection 2-pack    EPIPEN 2-KOJO    0.3 mL    Inject 0.3 mLs (0.3 mg) into the muscle once as needed for anaphylaxis    Mast cell disease, systemic       fluticasone 50 MCG/ACT spray    FLONASE    1 Bottle    Spray 2 sprays into both nostrils daily    Chronic allergic rhinitis due to animal hair and dander       HYDROcodone-acetaminophen 5-325 MG per tablet    NORCO    8 tablet    Take 1 tablet by mouth every 6 hours as needed for severe pain    Injury of right ring finger, initial encounter       loratadine 10 MG tablet    CLARITIN    30 tablet    Take 1 tablet (10 mg) by mouth daily        MULTIPLE vitamin   S/WOMENS Tabs     100 tablet    Take 1 tablet by mouth daily    Adjustment disorder with mixed anxiety and depressed mood       order for DME     1 Device    Equipment being ordered: Donjoy knee/patella stabilizer brace, right    Right knee pain, EDS (Anton-Danlos syndrome)       paragard intrauterine copper      1 each by Intrauterine route once        sulfacetamide sodium-sulfur 10-5 % Lotn     60 g    Use daily as directed    Acne vulgaris       tretinoin 0.025 % cream    RETIN-A    45 g    Use every night    Acne vulgaris       Vitamin D (Cholecalciferol) 400 units Caps     30 capsule    Take 800 Units by mouth daily    Vitamin D deficiency

## 2018-08-24 NOTE — NURSING NOTE
Drug Administration Record    Drug Name: triamcinolone acetonide(kenalog)  Dose: 0.1MG/ML  Route administered: ID  NDC #: Kenalog-10 (0775-6755-99)  Amount of waste(mL):4ml  Reason for waste: Single use vial    LOT #: [id number]   SITE: [facility]  : [employer]  EXPIRATION DATE: 02/2020

## 2018-08-24 NOTE — PROGRESS NOTES
SOAP note objective information for 8/24/2018.    Please refer to the daily flowsheet for treatment today, total treatment time and time spent performing 1:1 timed codes.       Objective:  Observation: Color/Temperature: Mild pink color to radial PIPj of R RF      PAIN 7/27/2018     Location Right   ring finger     Description  Aching, Miserable, Sharp, Shooting, Stabbing and Tender     Radiates yes     Worse d/n daytime and nighttime     Frequency intermittant     Exacerbated by Hand use      Relieves cold, heat and rest     At rest 0-10/10 3/10     On use 0-10/10 5/10     At worst.0-10/10 7/10     Sleep disruption?   no     Progression Gradually getting better.         Date: 7/27/2018 7/27/2018 8/17/18 8/24/2018     ring finger  AROM  (PROM) Right Left Right R          MCP  /95 WNL /95    PIP 30/82  (20/x)   26/95  (20/x) 31/NT  post   DIP  0/32  /65    TROM       DPC flex lag             Strength:  [x]   Contraindicated  Edema:  mild of the  ring finger  Wound/Scar: mild pinkness noted at the radial PIP from previous orthosis  Palpation:  []     Normal        [x]   Tender       []  Mild         [x]   Moderate [x]   Severe   Location: volar plate area, and radial aspect of PIP      Sensation:  WNL throughout all nerve distributions; per patient report    Home Exercise Program:  Wear orthosis for between exercises, and at night  DIP blocking in the orthosis  8/17  Extensor tracking with very gentle pressure over top of PIP  8/24/2018  New extension orthosis  Coban under orthosis for swelling    Next Visit:  Progress PIP extension slowly and DIP flexion/tendon gliding  Progress strengthening when ready, and see if appt needed for f/u w Dr. Borja
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
## 2018-08-24 NOTE — MR AVS SNAPSHOT
After Visit Summary   8/24/2018    Reba Arboleda    MRN: [id number]           Patient Information     Date Of Birth          1985        Visit Information        Provider Department      8/24/2018 9:30 AM Jailyn Quezada OT [facility]        Today's Diagnoses     Stiffness of finger joint of right hand    -  1    Pain of right hand        Anton-Danlos syndrome           Follow-ups after your visit        Your next 10 appointments already scheduled     Sep 11, 2018 10:00 AM CDT   (Arrive by 9:45 AM)   Return Visit with Juan Carlos Banda MD   [facility] ([facility] [place])    [address] [place] [address]  5th [facility] [id number] [phone]            Nov 27, 2018  1:30 PM CST   (Arrive by 1:15 PM)   Return Visit with Jigna Norwood PA-C   [facility] ([facility] [place])    [address] [place] [address]  3rd [facility] [id number] [phone]              Who to contact     If you have questions or need follow up information about today's clinic visit or your schedule please contact [facility] directly at [phone].  Normal or non-critical lab and imaging results will be communicated to you by [employer], letter or phone within 4 business days after the clinic has received the results. If you do not hear from us within 7 days, please contact the clinic through [employer] or phone. If you have a critical or abnormal lab result, we will notify you by phone as soon as possible.  Submit refill requests through [employer] or call your pharmacy and they will forward the refill request to us. Please allow 3 business days for your refill to be completed.          Additional Information About Your Visit        MyChart Information     [employer] gives you secure access to your electronic health record. If you see a primary care provider, you can also send messages to your care team and make appointments. If you have  questions, please call your primary care clinic.  If you do not have a primary care provider, please call [phone] and they will assist you.        Care EveryWhere ID     This is your Care EveryWhere ID. This could be used by other organizations to access your [facility] medical records  [id number]         Blood Pressure from Last 3 Encounters:   07/26/18 106/65   07/26/18 107/68   07/23/18 107/68    Weight from Last 3 Encounters:   07/27/18 71.7 kg (158 lb)   07/26/18 72 kg (158 lb 11.2 oz)   07/26/18 69.9 kg (154 lb)              We Performed the Following     C OT ORTHOTICS/PROSTH MGMT &/TRAING SBSQ ENCTR, EA 15 MIN     THERAPEUTIC EXERCISES          Today's Medication Changes          These changes are accurate as of 8/24/18 10:43 AM.  If you have any questions, ask your nurse or doctor.               Start taking these medicines.        Dose/Directions    benzoyl peroxide 5 % Liqd   Used for:  Acne vulgaris   Started by:  Jigna Norwood PA-C        Use daily as directed   Quantity:  140 g   Refills:  3       sulfacetamide sodium-sulfur 10-5 % Lotn   Used for:  Acne vulgaris   Started by:  Jigna Norwood PA-C        Use daily as directed   Quantity:  60 g   Refills:  1       tretinoin 0.025 % cream   Commonly known as:  RETIN-A   Used for:  Acne vulgaris   Started by:  Jigna Norwood PA-C        Use every night   Quantity:  45 g   Refills:  1         These medicines have changed or have updated prescriptions.        Dose/Directions    loratadine 10 MG tablet   Commonly known as:  CLARITIN   This may have changed:    - when to take this  - reasons to take this        Dose:  10 mg   Take 1 tablet (10 mg) by mouth daily   Quantity:  30 tablet   Refills:  11            Where to get your medicines      These medications were sent to [facility] - [place], [place] - [address]  [address], [place] [address]     Phone:  [phone]     benzoyl peroxide 5 % Liqd     sulfacetamide sodium-sulfur 10-5 % Lotn    tretinoin 0.025 % cream                Primary Care Provider Office Phone # Fax #    EDY Zacarias [facility] [phone] [address] [place] [address]        Equal Access to Services     GORDY KEYES : Hadii aad ku hadasho Soomaali, waaxda luqadaha, qaybta kaalmada adeegyada, waxay idiin hayaan adeabdiaziz khprabhadayanna zamora. So [facility] [phone].    ATENCIÓN: Si habla español, tiene a laguerre disposición servicios gratuitos de asistencia lingüística. [facility] [phone].    We comply with applicable federal civil rights laws and [place] laws. We do not discriminate on the basis of race, color, national origin, age, disability, sex, sexual orientation, or gender identity.            Thank you!     Thank you for choosing [facility]  for your care. Our goal is always to provide you with excellent care. Hearing back from our patients is one way we can continue to improve our services. Please take a few minutes to complete the written survey that you may receive in the mail after your visit with us. Thank you!             Your Updated Medication List - Protect others around you: Learn how to safely use, store and throw away your medicines at www.disposemymeds.org.          This list is accurate as of 8/24/18 10:43 AM.  Always use your most recent med list.                   Brand Name Dispense Instructions for use Diagnosis    albuterol 108 (90 Base) MCG/ACT inhaler    PROAIR HFA    8.5 g    Inhale 1-2 puffs into the lungs every 4 hours as needed for shortness of breath / dyspnea or wheezing    Mast cell disease, systemic, Uncomplicated asthma, unspecified asthma severity, unspecified whether persistent       amitriptyline 10 MG tablet    ELAVIL    30 tablet    Start at 1 tab at bedtime for 3 days, then 2 tabs at bedtime for 3 days, then 3 tabs at bedtime.    Adjustment disorder with mixed anxiety and depressed mood, Insomnia,  unspecified type       benzoyl peroxide 5 % Liqd     140 g    Use daily as directed    Acne vulgaris       EPINEPHrine 0.3 MG/0.3ML injection 2-pack    EPIPEN 2-KOJO    0.3 mL    Inject 0.3 mLs (0.3 mg) into the muscle once as needed for anaphylaxis    Mast cell disease, systemic       fluticasone 50 MCG/ACT spray    FLONASE    1 Bottle    Spray 2 sprays into both nostrils daily    Chronic allergic rhinitis due to animal hair and dander       HYDROcodone-acetaminophen 5-325 MG per tablet    NORCO    8 tablet    Take 1 tablet by mouth every 6 hours as needed for severe pain    Injury of right ring finger, initial encounter       loratadine 10 MG tablet    CLARITIN    30 tablet    Take 1 tablet (10 mg) by mouth daily        MULTIPLE vitamin  S/WOMENS Tabs     100 tablet    Take 1 tablet by mouth daily    Adjustment disorder with mixed anxiety and depressed mood       order for DME     1 Device    Equipment being ordered: Donjoy knee/patella stabilizer brace, right    Right knee pain, EDS (Anton-Danlos syndrome)       paragard intrauterine copper      1 each by Intrauterine route once        sulfacetamide sodium-sulfur 10-5 % Lotn     60 g    Use daily as directed    Acne vulgaris       tretinoin 0.025 % cream    RETIN-A    45 g    Use every night    Acne vulgaris       Vitamin D (Cholecalciferol) 400 units Caps     30 capsule    Take 800 Units by mouth daily    Vitamin D deficiency
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
## 2018-08-29 ENCOUNTER — RADIANT APPOINTMENT (OUTPATIENT)
Dept: GENERAL RADIOLOGY | Facility: CLINIC | Age: 33
End: 2018-08-29
Attending: FAMILY MEDICINE
Payer: MEDICARE

## 2018-08-29 ENCOUNTER — OFFICE VISIT (OUTPATIENT)
Dept: ORTHOPEDICS | Facility: CLINIC | Age: 33
End: 2018-08-29
Payer: MEDICARE

## 2018-08-29 VITALS
HEART RATE: 106 BPM | BODY MASS INDEX: 24.8 KG/M2 | DIASTOLIC BLOOD PRESSURE: 78 MMHG | SYSTOLIC BLOOD PRESSURE: 136 MMHG | HEIGHT: 67 IN | WEIGHT: 158 LBS

## 2018-08-29 DIAGNOSIS — M79.672 LEFT FOOT PAIN: ICD-10-CM

## 2018-08-29 DIAGNOSIS — M79.672 LEFT FOOT PAIN: Primary | ICD-10-CM

## 2018-08-29 DIAGNOSIS — S90.32XA CONTUSION OF LEFT FOOT, INITIAL ENCOUNTER: ICD-10-CM

## 2018-08-29 NOTE — PROGRESS NOTES
"      SPORTS & ORTHOPEDIC WALK-IN VISIT 8/29/2018    Primary Care Physician: Dr. López    Dropped kettle bell on foot. Most pain is over lateral distal fourth and fifth metatarsals. Has bruising in same area. Walks with limp.   Also would like new right knee brace - she misplaced hers.     Reason for visit:     What part of your body is injured / painful?  left foot    What caused the injury /pain? Dropped kettle bell     How long ago did your injury occur or pain begin? yesterday    What are your most bothersome symptoms? Pain    How would you characterize your symptom?  aching    What makes your symptoms better? Rest, ice    What makes your symptoms worse? Standing and Walking    Have you been previously seen for this problem? No    Medical History:    Any recent changes to your medical history? No    Any new medication prescribed since last visit? No    Have you had surgery on this body part before? No    Social History:    Occupation: Disability    Handedness: Right    Exercise: Most days/week    Review of Systems:    Do you have fever, chills, weight loss? No    Do you have any vision problems? Yes, seeing eye doctor    Do you have any chest pain or edema? No    Do you have any shortness of breath or wheezing?  No    Do you have stomach problems? Yes, \"normal\"    Do you have any numbness or focal weakness? Yes, hands and feet    Do you have diabetes? No    Do you have problems with bleeding or clotting? Yes, cryoglobinemia     Do you have an rashes or other skin lesions? Yes, from EDS         "

## 2018-08-29 NOTE — MR AVS SNAPSHOT
After Visit Summary   8/29/2018    Reba Arboleda    MRN: [id number]           Patient Information     Date Of Birth          1985        Visit Information        Provider Department      8/29/2018 12:50 PM Tray Pandey DO [facility]        Today's Diagnoses     Left foot pain    -  1    Contusion of left foot, initial encounter           Follow-ups after your visit        Your next 10 appointments already scheduled     Sep 07, 2018 12:30 PM CDT   [facility] Hand with Liliana Meraz OT   [facility] ([facility])    [address] [place] [id number] [phone]            Sep 07, 2018  1:00 PM CDT   [facility] Extremity with Nadeem Cole   [facility] ([facility])    [address] [place] [id number] [phone]            Sep 11, 2018 10:00 AM CDT   (Arrive by 9:45 AM)   Return Visit with Juan Carlos Banda MD   [facility] ([facility])    [address] [place] [id number] [phone]            Sep 11, 2018 10:30 AM CDT   [facility] Hand with Harriet Lea OT   [facility] ([facility])    [address] [place] [id number] [phone]            Sep 11, 2018 12:50 PM CDT   [facility] Extremity with Jose Perez PT   [facility] ([facility])    [address] [place] [address] [id number] [phone]            Nov 27, 2018  1:30 PM CST   (Arrive by 1:15 PM)   Return Visit with Jigna Norwood PA-C   [facility] ([facility])    [address] [place] [id number] [phone]              Who to contact     Please call your clinic at [phone] to:    Ask questions about your  "health    Make or cancel appointments    Discuss your medicines    Learn about your test results    Speak to your doctor            Additional Information About Your Visit        [employer] Information     [employer] gives you secure access to your electronic health record. If you see a primary care provider, you can also send messages to your care team and make appointments. If you have questions, please call your primary care clinic.  If you do not have a primary care provider, please call [phone] and they will assist you.      [employer] is an electronic gateway that provides easy, online access to your medical records. With [employer], you can request a clinic appointment, read your test results, renew a prescription or communicate with your care team.     To access your existing account, please contact your [facility] or call [phone] for assistance.        Care EveryWhere ID     This is your Care EveryWhere ID. This could be used by other organizations to access your [facility] medical records  [id number]        Your Vitals Were     Pulse Height BMI (Body Mass Index)             106 1.702 m (5' 7\") 24.75 kg/m2          Blood Pressure from Last 3 Encounters:   08/29/18 136/78   07/26/18 106/65   07/26/18 107/68    Weight from Last 3 Encounters:   08/29/18 71.7 kg (158 lb)   07/27/18 71.7 kg (158 lb)   07/26/18 72 kg (158 lb 11.2 oz)                 Today's Medication Changes          These changes are accurate as of 8/29/18 11:59 PM.  If you have any questions, ask your nurse or doctor.               These medicines have changed or have updated prescriptions.        Dose/Directions    loratadine 10 MG tablet   Commonly known as:  CLARITIN   This may have changed:    - when to take this  - reasons to take this        Dose:  10 mg   Take 1 tablet (10 mg) by mouth daily   Quantity:  30 tablet   Refills:  11                Primary Care Provider Office Phone # Fax #    Ruth Ann HADDAD " Letty, APRN [phone]       [address] [place] [address]        Equal Access to Services     GORDY KEYES : Hadii aad ku hademmiesavanna Sopartha, waaxda luqadaha, qaybta kaalmada ademargo, dexter guilhermein hayaaese weeksabdiaziz celeste teresa . So [facility] [phone].    ATENCIÓN: Si habla español, tiene a laguerre disposición servicios gratuitos de asistencia lingüística. Llame al [phone].    We comply with applicable federal civil rights laws and [place] laws. We do not discriminate on the basis of race, color, national origin, age, disability, sex, sexual orientation, or gender identity.            Thank you!     Thank you for choosing [facility] CLINIC  for your care. Our goal is always to provide you with excellent care. Hearing back from our patients is one way we can continue to improve our services. Please take a few minutes to complete the written survey that you may receive in the mail after your visit with us. Thank you!             Your Updated Medication List - Protect others around you: Learn how to safely use, store and throw away your medicines at www.disposemymeds.org.          This list is accurate as of 8/29/18 11:59 PM.  Always use your most recent med list.                   Brand Name Dispense Instructions for use Diagnosis    albuterol 108 (90 Base) MCG/ACT inhaler    PROAIR HFA    8.5 g    Inhale 1-2 puffs into the lungs every 4 hours as needed for shortness of breath / dyspnea or wheezing    Mast cell disease, systemic, Uncomplicated asthma, unspecified asthma severity, unspecified whether persistent       amitriptyline 10 MG tablet    ELAVIL    30 tablet    Start at 1 tab at bedtime for 3 days, then 2 tabs at bedtime for 3 days, then 3 tabs at bedtime.    Adjustment disorder with mixed anxiety and depressed mood, Insomnia, unspecified type       benzoyl peroxide 5 % Liqd     140 g    Use daily as directed    Acne vulgaris       EPINEPHrine  0.3 MG/0.3ML injection 2-pack    EPIPEN 2-KOJO    0.3 mL    Inject 0.3 mLs (0.3 mg) into the muscle once as needed for anaphylaxis    Mast cell disease, systemic       fluticasone 50 MCG/ACT spray    FLONASE    1 Bottle    Spray 2 sprays into both nostrils daily    Chronic allergic rhinitis due to animal hair and dander       HYDROcodone-acetaminophen 5-325 MG per tablet    NORCO    8 tablet    Take 1 tablet by mouth every 6 hours as needed for severe pain    Injury of right ring finger, initial encounter       loratadine 10 MG tablet    CLARITIN    30 tablet    Take 1 tablet (10 mg) by mouth daily        MULTIPLE vitamin  S/WOMENS Tabs     100 tablet    Take 1 tablet by mouth daily    Adjustment disorder with mixed anxiety and depressed mood       order for DME     1 Device    Equipment being ordered: Donjoy knee/patella stabilizer brace, right    Right knee pain, EDS (Atnon-Danlos syndrome)       paragard intrauterine copper      1 each by Intrauterine route once        sulfacetamide sodium-sulfur 10-5 % Lotn     60 g    Use daily as directed    Acne vulgaris       tretinoin 0.025 % cream    RETIN-A    45 g    Use every night    Acne vulgaris       Vitamin D (Cholecalciferol) 400 units Caps     30 capsule    Take 800 Units by mouth daily    Vitamin D deficiency

## 2018-08-29 NOTE — PROGRESS NOTES
[facility]  Orthopedics  Tray Pandey, DO  2018     Name: Reba Arboleda  MRN: [id number]  Age: 32 year old  : 1985  Referring provider: Referred Self     Chief Complaint: Pain of the Left Foot       Date of Injury: 18    History of Present Illness:   Reba Arboleda is a 32 year old female with a history of Anton-Danlos syndrome who presents today for evaluation of left foot pain. She reports that she dropped a kettle bell on her foot while looking for her knee brace yesterday She localizes pain on the distal 4th and 5th metatarsals and is concerned of a metatarsal fracture. Mild swelling. Difficulty walking overall. Worse standing, walking.    Treatment has included rest, ice.    Review of Systems:   A 14-point review of systems was obtained and is negative except for as noted in the HPI.     Medications:     Current Outpatient Prescriptions:      albuterol (PROAIR HFA) 108 (90 Base) MCG/ACT Inhaler, Inhale 1-2 puffs into the lungs every 4 hours as needed for shortness of breath / dyspnea or wheezing, Disp: 8.5 g, Rfl: 2     amitriptyline (ELAVIL) 10 MG tablet, Start at 1 tab at bedtime for 3 days, then 2 tabs at bedtime for 3 days, then 3 tabs at bedtime., Disp: 30 tablet, Rfl: 0     benzoyl peroxide 5 % LIQD, Use daily as directed, Disp: 140 g, Rfl: 3     EPINEPHrine (EPIPEN 2-KOJO) 0.3 MG/0.3ML injection 2-pack, Inject 0.3 mLs (0.3 mg) into the muscle once as needed for anaphylaxis, Disp: 0.3 mL, Rfl: 1     fluticasone (FLONASE) 50 MCG/ACT spray, Spray 2 sprays into both nostrils daily, Disp: 1 Bottle, Rfl: 3     HYDROcodone-acetaminophen (NORCO) 5-325 MG per tablet, Take 1 tablet by mouth every 6 hours as needed for severe pain (Patient not taking: Reported on 2018), Disp: 8 tablet, Rfl: 0     loratadine (CLARITIN) 10 MG tablet, Take 1 tablet (10 mg) by mouth daily (Patient taking differently: Take 10 mg by mouth daily as needed ), Disp: 30 tablet, Rfl: 11     Multiple Vitamins-Minerals  "(MULTIPLE VITAMIN  S/WOMENS) TABS, Take 1 tablet by mouth daily (Patient not taking: Reported on 7/26/2018), Disp: 100 tablet, Rfl: 1     ORDER FOR DME, Equipment being ordered: [employer] knee/patella stabilizer brace, right (Patient not taking: Reported on 7/17/2018), Disp: 1 Device, Rfl: 0     paragard intrauterine copper, 1 each by Intrauterine route once, Disp: , Rfl:      sulfacetamide sodium-sulfur 10-5 % LOTN, Use daily as directed, Disp: 60 g, Rfl: 1     tretinoin (RETIN-A) 0.025 % cream, Use every night, Disp: 45 g, Rfl: 1     Vitamin D, Cholecalciferol, 400 units CAPS, Take 800 Units by mouth daily, Disp: 30 capsule, Rfl: 3    Allergies:  Cat Hair Extract  Ibuprofen  Mites  Tape    Past Medical History:  Abnormal blood coagulation profile  Arthritis  Cryoglubinemia  Anton-Danlos syndrome  GERD  History of clostridium difficile injection  Hypermobile joints  Mast cell disorder  Positive NIRALI  Skin scarring/fibrosis  Uncomplicated asthma  Urinary frequency  Vitamin B12 deficiency    Past Surgical History:  Adenoidectomy  HC knee scope, single menisectomy - 2014  [place], cervical  Tonsillectomy    Social History:  Not reported    Family History:  Negative for bleeding or clotting disorders or adverse reactions to anesthesia.    Physical Examination:  Blood pressure 136/78, pulse 106, height 1.702 m (5' 7\"), weight 71.7 kg (158 lb)  General  - normal appearance, in no obvious distress  CV  - normal pulses at posterior tib and dorsalis pedis  Pulm  - normal respiratory pattern, non-labored  Musculoskeletal - Left foot  - stance: slight limp, no obvious leg length discrepancy  - inspection: normal bone and joint alignment, no obvious deformity.  - palpation: no soft tissue tenderness. Exquisite tenderness at 4th and 5th metatarsal shaft and head.   - ROM: normal dorsiflexion, plantar flexion, inversion, painful eversion  - strength: 5/5 in all planes  Neuro  - no sensory or motor deficit, grossly normal " coordination, normal muscle tone  Skin  - ecchymosis lateral 4th and 5th midshaft to distal metatarsal, no erythema, warmth, or induration, no obvious rash  Psych  - interactive, appropriate, normal mood and affect      Imaging:   Radiographs of the left foot - 3 views (08/29/2018)  No evidence of fracture    I have independently reviewed the above imaging studies; the results were discussed with the patient.     Assessment:   32 year old female with a history Anton-Danlos syndrome who presents with a left foot pain. XR normal. Suspected contusion due to kettle bell.     Diagnosis: Left foot contusion.     Plan:   1) Will provide her with another knee brace to replace the one she lost   2) In terms of left foot, no restrictions. rest, ice, ibuprofen  3) Offered post op shoe, patient declined. Will wear firm toed tennis shoe instead.       I, Tray Pandey DO, have reviewed the above note and agree with the scribe's notation as written.    Scribe Disclosure:   I, Larry Colorado, am serving as a scribe to document services personally performed by Tray Pandey DO at this visit, based upon the provider's statements to me. All documentation has been reviewed by the aforementioned provider prior to being entered into the official medical record.     Portions of this medical record were completed by a scribe. UPON MY REVIEW AND AUTHENTICATION BY ELECTRONIC SIGNATURE, this confirms (a) I performed the applicable clinical services, and (b) the record is accurate.

## 2018-08-29 NOTE — LETTER
2018     RE: eRba Arboleda  [address]  [address]  [place] [address]     Dear Colleague,    Thank you for referring your patient, Reba Arboleda, to the [facility] SPORTS AND ORTHOPAEDIC WALK IN CLINIC at [facility]. Please see a copy of my visit note below.    [facility]  Orthopedics  RoyalAnnika Pandey, DO  2018     Name: Reba Arboleda  MRN: [id number]  Age: 32 year old  : 1985  Referring provider: Referred Self     Chief Complaint: Pain of the Left Foot       Date of Injury: 18    History of Present Illness:   Reba Arboleda is a 32 year old female with a history of Anton-Danlos syndrome who presents today for evaluation of left foot pain. She reports that she dropped a kettle bell on her foot while looking for her knee brace yesterday She localizes pain on the distal 4th and 5th metatarsals and is concerned of a metatarsal fracture. Mild swelling. Difficulty walking overall. Worse standing, walking.    Treatment has included rest, ice.    Review of Systems:   A 14-point review of systems was obtained and is negative except for as noted in the HPI.     Medications:     Current Outpatient Prescriptions:      albuterol (PROAIR HFA) 108 (90 Base) MCG/ACT Inhaler, Inhale 1-2 puffs into the lungs every 4 hours as needed for shortness of breath / dyspnea or wheezing, Disp: 8.5 g, Rfl: 2     amitriptyline (ELAVIL) 10 MG tablet, Start at 1 tab at bedtime for 3 days, then 2 tabs at bedtime for 3 days, then 3 tabs at bedtime., Disp: 30 tablet, Rfl: 0     benzoyl peroxide 5 % LIQD, Use daily as directed, Disp: 140 g, Rfl: 3     EPINEPHrine (EPIPEN 2-KOJO) 0.3 MG/0.3ML injection 2-pack, Inject 0.3 mLs (0.3 mg) into the muscle once as needed for anaphylaxis, Disp: 0.3 mL, Rfl: 1     fluticasone (FLONASE) 50 MCG/ACT spray, Spray 2 sprays into both nostrils daily, Disp: 1 Bottle, Rfl: 3     HYDROcodone-acetaminophen (NORCO) 5-325 MG per tablet, Take 1 tablet by mouth every 6  "hours as needed for severe pain (Patient not taking: Reported on 7/26/2018), Disp: 8 tablet, Rfl: 0     loratadine (CLARITIN) 10 MG tablet, Take 1 tablet (10 mg) by mouth daily (Patient taking differently: Take 10 mg by mouth daily as needed ), Disp: 30 tablet, Rfl: 11     Multiple Vitamins-Minerals (MULTIPLE VITAMIN  S/WOMENS) TABS, Take 1 tablet by mouth daily (Patient not taking: Reported on 7/26/2018), Disp: 100 tablet, Rfl: 1     ORDER FOR DME, Equipment being ordered: [employer] knee/patella stabilizer brace, right (Patient not taking: Reported on 7/17/2018), Disp: 1 Device, Rfl: 0     paragard intrauterine copper, 1 each by Intrauterine route once, Disp: , Rfl:      sulfacetamide sodium-sulfur 10-5 % LOTN, Use daily as directed, Disp: 60 g, Rfl: 1     tretinoin (RETIN-A) 0.025 % cream, Use every night, Disp: 45 g, Rfl: 1     Vitamin D, Cholecalciferol, 400 units CAPS, Take 800 Units by mouth daily, Disp: 30 capsule, Rfl: 3    Allergies:  Cat Hair Extract  Ibuprofen  Mites  Tape    Past Medical History:  Abnormal blood coagulation profile  Arthritis  Cryoglubinemia  Anton-Danlos syndrome  GERD  History of clostridium difficile injection  Hypermobile joints  Mast cell disorder  Positive NIRALI  Skin scarring/fibrosis  Uncomplicated asthma  Urinary frequency  Vitamin B12 deficiency    Past Surgical History:  Adenoidectomy  HC knee scope, single menisectomy - 2014  [place], cervical  Tonsillectomy    Social History:  Not reported    Family History:  Negative for bleeding or clotting disorders or adverse reactions to anesthesia.    Physical Examination:  Blood pressure 136/78, pulse 106, height 1.702 m (5' 7\"), weight 71.7 kg (158 lb)  General  - normal appearance, in no obvious distress  CV  - normal pulses at posterior tib and dorsalis pedis  Pulm  - normal respiratory pattern, non-labored  Musculoskeletal - Left foot  - stance: slight limp, no obvious leg length discrepancy  - inspection: normal bone and joint " alignment, no obvious deformity.  - palpation: no soft tissue tenderness. Exquisite tenderness at 4th and 5th metatarsal shaft and head.   - ROM: normal dorsiflexion, plantar flexion, inversion, painful eversion  - strength: 5/5 in all planes  Neuro  - no sensory or motor deficit, grossly normal coordination, normal muscle tone  Skin  - ecchymosis lateral 4th and 5th midshaft to distal metatarsal, no erythema, warmth, or induration, no obvious rash  Psych  - interactive, appropriate, normal mood and affect      Imaging:   Radiographs of the left foot - 3 views (08/29/2018)  No evidence of fracture    I have independently reviewed the above imaging studies; the results were discussed with the patient.     Assessment:   32 year old female with a history Anton-Danlos syndrome who presents with a left foot pain. XR normal. Suspected contusion due to kettle bell.     Diagnosis: Left foot contusion.     Plan:   1) Will provide her with another knee brace to replace the one she lost   2) In terms of left foot, no restrictions. rest, ice, ibuprofen  3) Offered post op shoe, patient declined. Will wear firm toed tennis shoe instead.       I, Tray Pandey DO, have reviewed the above note and agree with the scribe's notation as written.    Scribe Disclosure:   I, Larry Colorado, am serving as a scribe to document services personally performed by Tray Pandey DO at this visit, based upon the provider's statements to me. All documentation has been reviewed by the aforementioned provider prior to being entered into the official medical record.     Portions of this medical record were completed by a scribe. UPON MY REVIEW AND AUTHENTICATION BY ELECTRONIC SIGNATURE, this confirms (a) I performed the applicable clinical services, and (b) the record is accurate.          SPORTS & ORTHOPEDIC WALK-IN VISIT 8/29/2018    Primary Care Physician: Dr. López    Dropped kettle bell on foot. Most pain is over lateral distal fourth and  "fifth metatarsals. Has bruising in same area. Walks with limp.   Also would like new right knee brace - she misplaced hers.     Reason for visit:     What part of your body is injured / painful?  left foot    What caused the injury /pain? Dropped kettle bell     How long ago did your injury occur or pain begin? yesterday    What are your most bothersome symptoms? Pain    How would you characterize your symptom?  aching    What makes your symptoms better? Rest, ice    What makes your symptoms worse? Standing and Walking    Have you been previously seen for this problem? No    Medical History:    Any recent changes to your medical history? No    Any new medication prescribed since last visit? No    Have you had surgery on this body part before? No    Social History:    Occupation: Disability    Handedness: Right    Exercise: Most days/week    Review of Systems:    Do you have fever, chills, weight loss? No    Do you have any vision problems? Yes, seeing eye doctor    Do you have any chest pain or edema? No    Do you have any shortness of breath or wheezing?  No    Do you have stomach problems? Yes, \"normal\"    Do you have any numbness or focal weakness? Yes, hands and feet    Do you have diabetes? No    Do you have problems with bleeding or clotting? Yes, cryoglobinemia     Do you have an rashes or other skin lesions? Yes, from EDS       Again, thank you for allowing me to participate in the care of your patient.      Sincerely,    Tray Pandey, DO    "

## 2018-08-31 ENCOUNTER — THERAPY VISIT (OUTPATIENT)
Dept: OCCUPATIONAL THERAPY | Facility: CLINIC | Age: 33
End: 2018-08-31
Payer: MEDICARE

## 2018-08-31 ENCOUNTER — THERAPY VISIT (OUTPATIENT)
Dept: PHYSICAL THERAPY | Facility: CLINIC | Age: 33
End: 2018-08-31
Payer: MEDICARE

## 2018-08-31 DIAGNOSIS — Q79.60 EHLERS-DANLOS SYNDROME: ICD-10-CM

## 2018-08-31 DIAGNOSIS — M25.641 STIFFNESS OF FINGER JOINT OF RIGHT HAND: Primary | ICD-10-CM

## 2018-08-31 DIAGNOSIS — M25.561 RIGHT KNEE PAIN: ICD-10-CM

## 2018-08-31 DIAGNOSIS — M79.641 PAIN OF RIGHT HAND: ICD-10-CM

## 2018-08-31 PROCEDURE — 97110 THERAPEUTIC EXERCISES: CPT | Mod: GO | Performed by: OCCUPATIONAL THERAPIST

## 2018-08-31 PROCEDURE — 97140 MANUAL THERAPY 1/> REGIONS: CPT | Mod: GO | Performed by: OCCUPATIONAL THERAPIST

## 2018-08-31 PROCEDURE — 97110 THERAPEUTIC EXERCISES: CPT | Mod: GP | Performed by: PHYSICAL THERAPIST

## 2018-08-31 PROCEDURE — 29086 APPLICATION CAST FINGER: CPT | Mod: GO | Performed by: OCCUPATIONAL THERAPIST

## 2018-08-31 NOTE — MR AVS SNAPSHOT
After Visit Summary   8/31/2018    Reba Arboleda    MRN: [id number]           Patient Information     Date Of Birth          1985        Visit Information        Provider Department      8/31/2018 1:00 PM Liliana Meraz OT [facility]        Today's Diagnoses     Stiffness of finger joint of right hand    -  1    Pain of right hand        Anton-Danlos syndrome           Follow-ups after your visit        Your next 10 appointments already scheduled     Sep 07, 2018 12:00 PM CDT   [facility] Hand with BOLA [facility] ([facility])    [address] [place] [id number] [phone]            Sep 07, 2018  1:00 PM CDT   [facility] Extremity with Nadeem MCCRAY [facility] ([facility])    [address] [place] [id number] [phone]            Sep 11, 2018 10:00 AM CDT   (Arrive by 9:45 AM)   Return Visit with Juan Carlos Banda MD   [facility] ([facility])    [address] [place] [id number] [phone]            Sep 11, 2018 10:30 AM CDT   [facility] Hand with BOLA [facility] ([facility])    [address] [place] [id number] [phone]            Sep 11, 2018 12:50 PM CDT   [facility] Extremity with Jose Perez PT   [facility] ([facility])    [address] [place] [id number] [phone]            Nov 27, 2018  1:30 PM CST   (Arrive by 1:15 PM)   Return Visit with Jigna Norwood PA-C   [facility] Dermatology ([facility])    [address] [place] [id number] [phone]              Who to contact     If you have questions or need follow up information about today's clinic  visit or your schedule please contact [employer] directly at [phone].  Normal or non-critical lab and imaging results will be communicated to you by MyChart, letter or phone within 4 business days after the clinic has received the results. If you do not hear from us within 7 days, please contact the clinic through [employer] or phone. If you have a critical or abnormal lab result, we will notify you by phone as soon as possible.  Submit refill requests through [employer] or call your pharmacy and they will forward the refill request to us. Please allow 3 business days for your refill to be completed.          Additional Information About Your Visit        [employer] Information     [employer] gives you secure access to your electronic health record. If you see a primary care provider, you can also send messages to your care team and make appointments. If you have questions, please call your primary care clinic.  If you do not have a primary care provider, please call [phone] and they will assist you.        Care EveryWhere ID     This is your Care EveryWhere ID. This could be used by other organizations to access your [facility] medical records  [id number]         Blood Pressure from Last 3 Encounters:   08/29/18 136/78   07/26/18 106/65   07/26/18 107/68    Weight from Last 3 Encounters:   08/29/18 71.7 kg (158 lb)   07/27/18 71.7 kg (158 lb)   07/26/18 72 kg (158 lb 11.2 oz)              We Performed the Following     APPY FINGER CAST     MANUAL THER TECH,1+REGIONS,EA 15 MIN     THERAPEUTIC EXERCISES          Today's Medication Changes          These changes are accurate as of 8/31/18  2:54 PM.  If you have any questions, ask your nurse or doctor.               These medicines have changed or have updated prescriptions.        Dose/Directions    loratadine 10 MG tablet   Commonly known as:  CLARITIN   This may have changed:    - when to take this  - reasons to take this        Dose:  10 mg   Take 1 tablet  (10 mg) by mouth daily   Quantity:  30 tablet   Refills:  11                Primary Care Provider Office Phone # Fax #    [facility] [phone] [address] [place] [address]        Equal Access to Services     GORDY KEYES : Hadii aad ku hadasho Soomaali, waaxda luqadaha, qaybta kaalmada adeegyada, waxay idiin hayaan ayaka maeprabhadayanna moore . So [facility] [phone].    ATENCIÓN: Si habla español, tiene a laguerre disposición servicios gratuitos de asistencia lingüística. Shaun al [phone].    We comply with applicable federal civil rights laws and [place] laws. We do not discriminate on the basis of race, color, national origin, age, disability, sex, sexual orientation, or gender identity.            Thank you!     Thank you for choosing [facility]  for your care. Our goal is always to provide you with excellent care. Hearing back from our patients is one way we can continue to improve our services. Please take a few minutes to complete the written survey that you may receive in the mail after your visit with us. Thank you!             Your Updated Medication List - Protect others around you: Learn how to safely use, store and throw away your medicines at www.disposemymeds.org.          This list is accurate as of 8/31/18  2:54 PM.  Always use your most recent med list.                   Brand Name Dispense Instructions for use Diagnosis    albuterol 108 (90 Base) MCG/ACT inhaler    PROAIR HFA    8.5 g    Inhale 1-2 puffs into the lungs every 4 hours as needed for shortness of breath / dyspnea or wheezing    Mast cell disease, systemic, Uncomplicated asthma, unspecified asthma severity, unspecified whether persistent       amitriptyline 10 MG tablet    ELAVIL    30 tablet    Start at 1 tab at bedtime for 3 days, then 2 tabs at bedtime for 3 days, then 3 tabs at bedtime.    Adjustment disorder with mixed anxiety and depressed mood, Insomnia, unspecified  type       benzoyl peroxide 5 % Liqd     140 g    Use daily as directed    Acne vulgaris       EPINEPHrine 0.3 MG/0.3ML injection 2-pack    EPIPEN 2-KOJO    0.3 mL    Inject 0.3 mLs (0.3 mg) into the muscle once as needed for anaphylaxis    Mast cell disease, systemic       fluticasone 50 MCG/ACT spray    FLONASE    1 Bottle    Spray 2 sprays into both nostrils daily    Chronic allergic rhinitis due to animal hair and dander       HYDROcodone-acetaminophen 5-325 MG per tablet    NORCO    8 tablet    Take 1 tablet by mouth every 6 hours as needed for severe pain    Injury of right ring finger, initial encounter       loratadine 10 MG tablet    CLARITIN    30 tablet    Take 1 tablet (10 mg) by mouth daily        MULTIPLE vitamin  S/WOMENS Tabs     100 tablet    Take 1 tablet by mouth daily    Adjustment disorder with mixed anxiety and depressed mood       order for DME     1 Device    Equipment being ordered: [employer] knee/patella stabilizer brace, right    Right knee pain, EDS (Anton-Danlos syndrome)       paragard intrauterine copper      1 each by Intrauterine route once        sulfacetamide sodium-sulfur 10-5 % Lotn     60 g    Use daily as directed    Acne vulgaris       tretinoin 0.025 % cream    RETIN-A    45 g    Use every night    Acne vulgaris       Vitamin D (Cholecalciferol) 400 units Caps     30 capsule    Take 800 Units by mouth daily    Vitamin D deficiency

## 2018-08-31 NOTE — PROGRESS NOTES
Hand Therapy Progress Note  8/31/2018  Reporting period: 7/27/18 to current date  Diagnosis:  Right  ring finger volar plate injury and PIP contracture   DOI: 6/15/18     Referring MD: Erasmo Borja MD    Next MD visit: not scheduled    S:  Subjective changes as noted by patient: Pt notes the finger is not changing, she left the splint at home. It seems to be going backwards and more painful  Functional changes noted by patient: No Change to Household Chores  Response to previous treatment:  fair  Patient has noted adverse reaction to:   None          Objective:  Observation: Color/Temperature: Mild pink color to radial PIPj of R RF      PAIN 7/27/2018     Location Right   ring finger     Description  Aching, Miserable, Sharp, Shooting, Stabbing and Tender     Radiates yes     Worse d/n daytime and nighttime     Frequency intermittant     Exacerbated by Hand use      Relieves cold, heat and rest     At rest 0-10/10 3/10     On use 0-10/10 5/10     At worst.0-10/10 7/10     Sleep disruption?   no     Progression Gradually getting better.         Date: 7/27/2018 7/27/2018 8/17/18 8/24/2018 8/31/18   ring finger  AROM  (PROM) Right Left Right R R           MCP  /95 WNL /95     PIP 30/82  (20/x)   26/95  (20/x) 31/NT  post 35/  (20/x) after heat   DIP  0/32  /65     TROM        DPC flex lag              Strength:  [x]   Contraindicated  Edema:  mild of the  ring finger  Wound/Scar: mild pinkness noted at the radial PIP from previous orthosis  Palpation:  []     Normal        [x]   Tender       []  Mild         [x]   Moderate [x]   Severe   Location: volar plate area, and radial aspect of PIP      Sensation:  WNL throughout all nerve distributions; per patient report    Assessment:  Response to therapy has been lack of progress in:  ROM of PIP extension and Pain is about the same.     Overall Assessment:  Patient would benefit from continued therapy to achieve rehab potential  STG/LTG:  STGoals have been reviewed  and no progress has been made;  see goal sheet for details and changes.  I have re-evaluated this patient and find that the nature, scope, duration and intensity of the therapy is appropriate for the medical condition of the patient.      Home Exercise Program:  Wear orthosis for between exercises, and at night  DIP blocking in the orthosis  8/17  Extensor tracking with very gentle pressure over top of PIP  8/24/2018  New extension orthosis  Coban under orthosis for swelling  8/31/2018  Wear finger cast full time for one week      Next Visit:  Progress PIP extension slowly and DIP flexion/tendon gliding  Progress strengthening when ready, and see if appt needed for f/u w Dr. Borja

## 2018-09-07 ENCOUNTER — TELEPHONE (OUTPATIENT)
Dept: ORTHOPEDICS | Facility: CLINIC | Age: 33
End: 2018-09-07

## 2018-09-07 ENCOUNTER — THERAPY VISIT (OUTPATIENT)
Dept: OCCUPATIONAL THERAPY | Facility: CLINIC | Age: 33
End: 2018-09-07
Payer: MEDICARE

## 2018-09-07 DIAGNOSIS — M79.641 PAIN OF RIGHT HAND: ICD-10-CM

## 2018-09-07 DIAGNOSIS — M25.641 STIFFNESS OF FINGER JOINT OF RIGHT HAND: Primary | ICD-10-CM

## 2018-09-07 DIAGNOSIS — Q79.60 EHLERS-DANLOS SYNDROME: ICD-10-CM

## 2018-09-07 PROCEDURE — 29086 APPLICATION CAST FINGER: CPT | Mod: GO | Performed by: OCCUPATIONAL THERAPIST

## 2018-09-07 PROCEDURE — 97110 THERAPEUTIC EXERCISES: CPT | Mod: GO | Performed by: OCCUPATIONAL THERAPIST

## 2018-09-07 NOTE — TELEPHONE ENCOUNTER
[facility]    Phone Message    May a detailed message be left on voicemail: no    Reason for Call: Other: Pt of Dr. Borja, pt called stating she is doing PT with [facility] and they stretched her finger out and she is in extreme pain and would like some pain medicine.  Pt would like a call today because she would like to pick it up today.  Please call pt back     Action Taken: Message routed to:  [facility] ([facility] Ortho

## 2018-09-07 NOTE — MR AVS SNAPSHOT
After Visit Summary   9/7/2018    Reba Arboleda    MRN: [id number]           Patient Information     Date Of Birth          1985        Visit Information        Provider Department      9/7/2018 12:30 PM Liliana Meraz OT  [facility]        Today's Diagnoses     Stiffness of finger joint of right hand    -  1    Pain of right hand        Anton-Danlos syndrome           Follow-ups after your visit        Your next 10 appointments already scheduled     Sep 11, 2018 10:00 AM CDT   (Arrive by 9:45 AM)   Return Visit with Juan Carlos Banda MD   [facility] Medicine ([facility])    [address] [place] [address]  [address] [facility] [id number] [phone]            Sep 11, 2018 12:50 PM CDT   [facility] Extremity with Jose Perez PT   [facility] Physical Therapy [place] ([facility])    [address] [place] [address] [address] [facility] [id number] [phone]            Sep 18, 2018 12:30 PM CDT   ARMAND Hand with Liliana Meraz OT   [facility] Hand Therapy ([facility])    [address] [place] [address]  [address] [facility] [id number] [phone]            Nov 27, 2018  1:30 PM CST   (Arrive by 1:15 PM)   Return Visit with Jigna Norwood PA-C   [facility] Dermatology ([facility])    [address] [place] [address]  [address] [facility] [id number] [phone]              Who to contact     If you have questions or need follow up information about today's clinic visit or your schedule please contact [facility]ER[facility] directly at [phone].  Normal or non-critical lab and imaging results will be communicated to you by MyChart, letter or phone within 4 business days after the clinic has received the results. If you do not hear from us within 7 days, please contact the clinic through MyChart or phone. If you have a critical or abnormal lab result, we will notify you by phone as soon as  possible.  Submit refill requests through [employer] or call your pharmacy and they will forward the refill request to us. Please allow 3 business days for your refill to be completed.          Additional Information About Your Visit        [employer] Information     [employer] gives you secure access to your electronic health record. If you see a primary care provider, you can also send messages to your care team and make appointments. If you have questions, please call your primary care clinic.  If you do not have a primary care provider, please call [phone] and they will assist you.        Care EveryWhere ID     This is your Care EveryWhere ID. This could be used by other organizations to access your [place] medical records  [id number]         Blood Pressure from Last 3 Encounters:   08/29/18 136/78   07/26/18 106/65   07/26/18 107/68    Weight from Last 3 Encounters:   08/29/18 71.7 kg (158 lb)   07/27/18 71.7 kg (158 lb)   07/26/18 72 kg (158 lb 11.2 oz)              We Performed the Following     APPY FINGER CAST     THERAPEUTIC EXERCISES          Today's Medication Changes          These changes are accurate as of 9/7/18  6:57 PM.  If you have any questions, ask your nurse or doctor.               These medicines have changed or have updated prescriptions.        Dose/Directions    loratadine 10 MG tablet   Commonly known as:  CLARITIN   This may have changed:    - when to take this  - reasons to take this        Dose:  10 mg   Take 1 tablet (10 mg) by mouth daily   Quantity:  30 tablet   Refills:  11                Primary Care Provider Office Phone # Fax #    EDY Zacarias [facility] [phone] [address] [place] [address]        Equal Access to Services     [facility] AH: Hadsami Aguillon, stacey bills, qaybdexter harding. So [facility] [phone].    ATENCIÓN: Si habla español, tiene a laguerre disposición  servicios gratuitos de asistencia lingüística. Shaun moore [phone].    We comply with applicable federal civil rights laws and [place] laws. We do not discriminate on the basis of race, color, national origin, age, disability, sex, sexual orientation, or gender identity.            Thank you!     Thank you for choosing [facility] THERAP[facility]  for your care. Our goal is always to provide you with excellent care. Hearing back from our patients is one way we can continue to improve our services. Please take a few minutes to complete the written survey that you may receive in the mail after your visit with us. Thank you!             Your Updated Medication List - Protect others around you: Learn how to safely use, store and throw away your medicines at www.disposemymeds.org.          This list is accurate as of 9/7/18  6:57 PM.  Always use your most recent med list.                   Brand Name Dispense Instructions for use Diagnosis    albuterol 108 (90 Base) MCG/ACT inhaler    PROAIR HFA    8.5 g    Inhale 1-2 puffs into the lungs every 4 hours as needed for shortness of breath / dyspnea or wheezing    Mast cell disease, systemic, Uncomplicated asthma, unspecified asthma severity, unspecified whether persistent       amitriptyline 10 MG tablet    ELAVIL    30 tablet    Start at 1 tab at bedtime for 3 days, then 2 tabs at bedtime for 3 days, then 3 tabs at bedtime.    Adjustment disorder with mixed anxiety and depressed mood, Insomnia, unspecified type       benzoyl peroxide 5 % Liqd     140 g    Use daily as directed    Acne vulgaris       EPINEPHrine 0.3 MG/0.3ML injection 2-pack    EPIPEN 2-KOJO    0.3 mL    Inject 0.3 mLs (0.3 mg) into the muscle once as needed for anaphylaxis    Mast cell disease, systemic       fluticasone 50 MCG/ACT spray    FLONASE    1 Bottle    Spray 2 sprays into both nostrils daily    Chronic allergic rhinitis due to animal hair and dander       HYDROcodone-acetaminophen 5-325 MG per  tablet    NORCO    8 tablet    Take 1 tablet by mouth every 6 hours as needed for severe pain    Injury of right ring finger, initial encounter       loratadine 10 MG tablet    CLARITIN    30 tablet    Take 1 tablet (10 mg) by mouth daily        MULTIPLE vitamin  S/WOMENS Tabs     100 tablet    Take 1 tablet by mouth daily    Adjustment disorder with mixed anxiety and depressed mood       order for DME     1 Device    Equipment being ordered: Donjoy knee/patella stabilizer brace, right    Right knee pain, EDS (Anton-Danlos syndrome)       paragard intrauterine copper      1 each by Intrauterine route once        sulfacetamide sodium-sulfur 10-5 % Lotn     60 g    Use daily as directed    Acne vulgaris       tretinoin 0.025 % cream    RETIN-A    45 g    Use every night    Acne vulgaris       Vitamin D (Cholecalciferol) 400 units Caps     30 capsule    Take 800 Units by mouth daily    Vitamin D deficiency

## 2018-09-07 NOTE — PROGRESS NOTES
SOAP note objective information for 9/7/2018.  Please refer to the daily flowsheet for treatment today, total treatment time and time spent performing 1:1 timed codes.        Objective:  Observation: Color/Temperature: Mild pink color to radial PIPj of R RF      PAIN 7/27/2018     Location Right   ring finger     Description  Aching, Miserable, Sharp, Shooting, Stabbing and Tender     Radiates yes     Worse d/n daytime and nighttime     Frequency intermittant     Exacerbated by Hand use      Relieves cold, heat and rest     At rest 0-10/10 3/10     On use 0-10/10 5/10     At worst.0-10/10 7/10     Sleep disruption?   no     Progression Gradually getting better.         Date: 7/27/2018 7/27/2018 8/17/18 8/24/2018 8/31/18    ring finger  AROM  (PROM) Right Left Right R R             MCP  /95 WNL /95      PIP 30/82  (20/x)   26/95  (20/x) 31/NT  post 35/  (20/x) after heat 20/   DIP  0/32  /65      TROM         DPC flex lag               Strength:  [x]   Contraindicated  Edema:  mild of the  ring finger  Wound/Scar: mild pinkness noted at the radial PIP from previous orthosis  Palpation:  []     Normal        [x]   Tender       []  Mild         [x]   Moderate [x]   Severe   Location: volar plate area, and radial aspect of PIP      Sensation:  WNL throughout all nerve distributions; per patient report    Assessment:  See flowsheet    Home Exercise Program:  Wear orthosis for between exercises, and at night  DIP blocking in the orthosis  8/17  Extensor tracking with very gentle pressure over top of PIP  8/24/2018  New extension orthosis  Coban under orthosis for swelling  8/31/2018  Wear finger cast full time for one week      Next Visit:  Progress PIP extension slowly and DIP flexion/tendon gliding  Progress strengthening when ready, and see if appt needed for f/u w Dr. Borja

## 2018-09-07 NOTE — TELEPHONE ENCOUNTER
Called patient back after discussing with Lynda APONTE regarding pain medication. Explained to patient Dr. Rangel will not give pain medications and that she should use Tylenol, Ice, and rest. Patient became upset and stated we need to show compassion. That all she needs is something to get her to Monday.     Will Disscuss With RN again on what to do next.    Kathryn CHEEMA

## 2018-09-07 NOTE — TELEPHONE ENCOUNTER
Called patient back again. Stated the same thing as earlier message. Patient Ended the call after being told that we can not prescribe a pain medication due to her not having surgery.     Kathryn CHEEMA

## 2018-09-11 ENCOUNTER — HEALTH MAINTENANCE LETTER (OUTPATIENT)
Age: 33
End: 2018-09-11

## 2018-09-11 ENCOUNTER — OFFICE VISIT (OUTPATIENT)
Dept: ORTHOPEDICS | Facility: CLINIC | Age: 33
End: 2018-09-11
Payer: MEDICARE

## 2018-09-11 ENCOUNTER — THERAPY VISIT (OUTPATIENT)
Dept: OCCUPATIONAL THERAPY | Facility: CLINIC | Age: 33
End: 2018-09-11
Payer: MEDICARE

## 2018-09-11 VITALS — WEIGHT: 158 LBS | RESPIRATION RATE: 16 BRPM | BODY MASS INDEX: 24.8 KG/M2 | HEIGHT: 67 IN

## 2018-09-11 DIAGNOSIS — M25.641 STIFFNESS OF FINGER JOINT OF RIGHT HAND: ICD-10-CM

## 2018-09-11 DIAGNOSIS — M22.41 CHONDROMALACIA OF RIGHT PATELLA: ICD-10-CM

## 2018-09-11 DIAGNOSIS — Z87.828 H/O TEAR OF MENISCUS OF KNEE JOINT: Primary | ICD-10-CM

## 2018-09-11 DIAGNOSIS — Q79.60 EHLERS-DANLOS SYNDROME: ICD-10-CM

## 2018-09-11 DIAGNOSIS — M79.641 PAIN OF RIGHT HAND: ICD-10-CM

## 2018-09-11 PROCEDURE — 29086 APPLICATION CAST FINGER: CPT | Mod: GO | Performed by: OCCUPATIONAL THERAPIST

## 2018-09-11 PROCEDURE — G8986 CARRY D/C STATUS: HCPCS | Mod: GO | Performed by: OCCUPATIONAL THERAPIST

## 2018-09-11 PROCEDURE — G8985 CARRY GOAL STATUS: HCPCS | Mod: GO | Performed by: OCCUPATIONAL THERAPIST
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
## 2018-09-11 NOTE — MR AVS SNAPSHOT
After Visit Summary   9/11/2018    Reba Arboleda    MRN: [id number]           Patient Information     Date Of Birth          1985        Visit Information        Provider Department      9/11/2018 10:00 AM Juan Carlos Banda MD [facility] Sports Medicine        Today's Diagnoses     H/O tear of meniscus of knee joint    -  1    Chondromalacia of right patella           Follow-ups after your visit        Your next 10 appointments already scheduled     Sep 18, 2018 12:30 PM CDT   ARMAND Hand with Liliana Meraz OT   [facility] Hand Therapy ([facility])    [address]  [address] [facility] [address] [phone]            Nov 27, 2018  1:30 PM CST   (Arrive by 1:15 PM)   Return Visit with Jigna Norwood PA-C   [facility] Dermatology ([facility])    [address] [place] [address]  [address] [facility] [address] [phone]              Who to contact     Please call your clinic at [phone] to:    Ask questions about your health    Make or cancel appointments    Discuss your medicines    Learn about your test results    Speak to your doctor            Additional Information About Your Visit        [employer] Information     [employer] gives you secure access to your electronic health record. If you see a primary care provider, you can also send messages to your care team and make appointments. If you have questions, please call your primary care clinic.  If you do not have a primary care provider, please call [phone] and they will assist you.      [employer] is an electronic gateway that provides easy, online access to your medical records. With [employer], you can request a clinic appointment, read your test results, renew a prescription or communicate with your care team.     To access your existing account, please contact your [facility] or call [phone] for assistance.        Care EveryWhere ID      "This is your Care EveryWhere ID. This could be used by other organizations to access your [facility] medical records  [id number]        Your Vitals Were     Respirations Height BMI (Body Mass Index)             16 5' 7\" (1.702 m) 24.75 kg/m2          Blood Pressure from Last 3 Encounters:   08/29/18 136/78   07/26/18 106/65   07/26/18 107/68    Weight from Last 3 Encounters:   09/11/18 158 lb (71.7 kg)   08/29/18 158 lb (71.7 kg)   07/27/18 158 lb (71.7 kg)              Today, you had the following     No orders found for display         Today's Medication Changes          These changes are accurate as of 9/11/18  1:01 PM.  If you have any questions, ask your nurse or doctor.               These medicines have changed or have updated prescriptions.        Dose/Directions    loratadine 10 MG tablet   Commonly known as:  CLARITIN   This may have changed:    - when to take this  - reasons to take this        Dose:  10 mg   Take 1 tablet (10 mg) by mouth daily   Quantity:  30 tablet   Refills:  11                Primary Care Provider Office Phone # Fax #    EDY Zacarias [facility] [phone] [address] [place] [address]        Equal Access to Services     GORDY KEYES AH: Adalberto cardozao Sopartha, waaxda luqadaha, qaybta kaalmada adeegyada, dexter zamora. So [facility] [phone].    ATENCIÓN: Si habla español, tiene a laguerre disposición servicios gratuitos de asistencia lingüística. Llame al [phone].    We comply with applicable federal civil rights laws and [place] laws. We do not discriminate on the basis of race, color, national origin, age, disability, sex, sexual orientation, or gender identity.            Thank you!     Thank you for choosing [facility]  for your care. Our goal is always to provide you with excellent care. Hearing back from our patients is one way we can continue to improve our services. Please take a few " minutes to complete the written survey that you may receive in the mail after your visit with us. Thank you!             Your Updated Medication List - Protect others around you: Learn how to safely use, store and throw away your medicines at www.disposemymeds.org.          This list is accurate as of 9/11/18  1:01 PM.  Always use your most recent med list.                   Brand Name Dispense Instructions for use Diagnosis    albuterol 108 (90 Base) MCG/ACT inhaler    PROAIR HFA    8.5 g    Inhale 1-2 puffs into the lungs every 4 hours as needed for shortness of breath / dyspnea or wheezing    Mast cell disease, systemic, Uncomplicated asthma, unspecified asthma severity, unspecified whether persistent       amitriptyline 10 MG tablet    ELAVIL    30 tablet    Start at 1 tab at bedtime for 3 days, then 2 tabs at bedtime for 3 days, then 3 tabs at bedtime.    Adjustment disorder with mixed anxiety and depressed mood, Insomnia, unspecified type       benzoyl peroxide 5 % Liqd     140 g    Use daily as directed    Acne vulgaris       EPINEPHrine 0.3 MG/0.3ML injection 2-pack    EPIPEN 2-KOJO    0.3 mL    Inject 0.3 mLs (0.3 mg) into the muscle once as needed for anaphylaxis    Mast cell disease, systemic       fluticasone 50 MCG/ACT spray    FLONASE    1 Bottle    Spray 2 sprays into both nostrils daily    Chronic allergic rhinitis due to animal hair and dander       HYDROcodone-acetaminophen 5-325 MG per tablet    NORCO    8 tablet    Take 1 tablet by mouth every 6 hours as needed for severe pain    Injury of right ring finger, initial encounter       loratadine 10 MG tablet    CLARITIN    30 tablet    Take 1 tablet (10 mg) by mouth daily        MULTIPLE vitamin  S/WOMENS Tabs     100 tablet    Take 1 tablet by mouth daily    Adjustment disorder with mixed anxiety and depressed mood       order for DME     1 Device    Equipment being ordered: Naseem knee/patella stabilizer brace, right    Right knee pain, EDS  (Anton-Danlos syndrome)       paragard intrauterine copper      1 each by Intrauterine route once        sulfacetamide sodium-sulfur 10-5 % Lotn     60 g    Use daily as directed    Acne vulgaris       tretinoin 0.025 % cream    RETIN-A    45 g    Use every night    Acne vulgaris       triamcinolone acetonide 10 MG/ML injection    KENALOG    0.3 mL    Inject 0.3 mLs (3 mg) into the skin once for 1 dose    Acne vulgaris       Vitamin D (Cholecalciferol) 400 units Caps     30 capsule    Take 800 Units by mouth daily    Vitamin D deficiency

## 2018-09-11 NOTE — MR AVS SNAPSHOT
After Visit Summary   9/11/2018    eRba Arboleda    MRN: [id number]           Patient Information     Date Of Birth          1985        Visit Information        Provider Department      9/11/2018 11:00 AM Harriet Lea OT [facility] Hand Therapy        Today's Diagnoses     Stiffness of finger joint of right hand        Pain of right hand        Anton-Danlos syndrome           Follow-ups after your visit        Your next 10 appointments already scheduled     Sep 18, 2018 12:30 PM CDT   ARMAND Hand with Liliana Meraz OT   [facility] ([facility])    [address] [place] [address]  [address] [facility] [id number] [phone]            Nov 27, 2018  1:30 PM CST   (Arrive by 1:15 PM)   Return Visit with Jigna Norwood PA-C   [facility] Dermat[facility] ([facility])    [address] [place] [address]  [address] [facility] [id number] [phone]              Who to contact     If you have questions or need follow up information about today's clinic visit or your schedule please contact [facility]ERAP[facility] directly at [phone].  Normal or non-critical lab and imaging results will be communicated to you by [employer], letter or phone within 4 business days after the clinic has received the results. If you do not hear from us within 7 days, please contact the clinic through [employer] or phone. If you have a critical or abnormal lab result, we will notify you by phone as soon as possible.  Submit refill requests through [employer] or call your pharmacy and they will forward the refill request to us. Please allow 3 business days for your refill to be completed.          Additional Information About Your Visit        MyChart Information     [employer] gives you secure access to your electronic health record. If you see a primary care provider, you can also send messages to your care team and make appointments. If you have questions, please call your primary  care clinic.  If you do not have a primary care provider, please call [phone] and they will assist you.        Care EveryWhere ID     This is your Care EveryWhere ID. This could be used by other organizations to access your [facility] medical records  [id number]         Blood Pressure from Last 3 Encounters:   08/29/18 136/78   07/26/18 106/65   07/26/18 107/68    Weight from Last 3 Encounters:   09/11/18 71.7 kg (158 lb)   08/29/18 71.7 kg (158 lb)   07/27/18 71.7 kg (158 lb)              We Performed the Following     APPY FINGER CAST          Today's Medication Changes          These changes are accurate as of 9/11/18 12:43 PM.  If you have any questions, ask your nurse or doctor.               These medicines have changed or have updated prescriptions.        Dose/Directions    loratadine 10 MG tablet   Commonly known as:  CLARITIN   This may have changed:    - when to take this  - reasons to take this        Dose:  10 mg   Take 1 tablet (10 mg) by mouth daily   Quantity:  30 tablet   Refills:  11                Primary Care Provider Office Phone # Fax #    EDY Zacarias [phone] [address] [place] [address]        Equal Access to Services     GORDY KEYES AH: Hadii aad ku hadasho Soharjitali, waaxda luqadaha, qaybta kaalmada adeegyada, waxay krunal zamora. So [facility] [phone].    ATENCIÓN: Si habla español, tiene a laguerre disposición servicios gratuitos de asistencia lingüística. Llame al [phone].    We comply with applicable federal civil rights laws and [place] laws. We do not discriminate on the basis of race, color, national origin, age, disability, sex, sexual orientation, or gender identity.            Thank you!     Thank you for choosing [facility] HAND THERAP[facility]  for your care. Our goal is always to provide you with excellent care. Hearing back from our patients is one way we can continue to improve our services. Please  take a few minutes to complete the written survey that you may receive in the mail after your visit with us. Thank you!             Your Updated Medication List - Protect others around you: Learn how to safely use, store and throw away your medicines at www.disposemymeds.org.          This list is accurate as of 9/11/18 12:43 PM.  Always use your most recent med list.                   Brand Name Dispense Instructions for use Diagnosis    albuterol 108 (90 Base) MCG/ACT inhaler    PROAIR HFA    8.5 g    Inhale 1-2 puffs into the lungs every 4 hours as needed for shortness of breath / dyspnea or wheezing    Mast cell disease, systemic, Uncomplicated asthma, unspecified asthma severity, unspecified whether persistent       amitriptyline 10 MG tablet    ELAVIL    30 tablet    Start at 1 tab at bedtime for 3 days, then 2 tabs at bedtime for 3 days, then 3 tabs at bedtime.    Adjustment disorder with mixed anxiety and depressed mood, Insomnia, unspecified type       benzoyl peroxide 5 % Liqd     140 g    Use daily as directed    Acne vulgaris       EPINEPHrine 0.3 MG/0.3ML injection 2-pack    EPIPEN 2-KOJO    0.3 mL    Inject 0.3 mLs (0.3 mg) into the muscle once as needed for anaphylaxis    Mast cell disease, systemic       fluticasone 50 MCG/ACT spray    FLONASE    1 Bottle    Spray 2 sprays into both nostrils daily    Chronic allergic rhinitis due to animal hair and dander       HYDROcodone-acetaminophen 5-325 MG per tablet    NORCO    8 tablet    Take 1 tablet by mouth every 6 hours as needed for severe pain    Injury of right ring finger, initial encounter       loratadine 10 MG tablet    CLARITIN    30 tablet    Take 1 tablet (10 mg) by mouth daily        MULTIPLE vitamin  S/WOMENS Tabs     100 tablet    Take 1 tablet by mouth daily    Adjustment disorder with mixed anxiety and depressed mood       order for DME     1 Device    Equipment being ordered: Donjoy knee/patella stabilizer brace, right    Right knee pain,  EDS (Anton-Danlos syndrome)       paragard intrauterine copper      1 each by Intrauterine route once        sulfacetamide sodium-sulfur 10-5 % Lotn     60 g    Use daily as directed    Acne vulgaris       tretinoin 0.025 % cream    RETIN-A    45 g    Use every night    Acne vulgaris       triamcinolone acetonide 10 MG/ML injection    KENALOG    0.3 mL    Inject 0.3 mLs (3 mg) into the skin once for 1 dose    Acne vulgaris       Vitamin D (Cholecalciferol) 400 units Caps     30 capsule    Take 800 Units by mouth daily    Vitamin D deficiency

## 2018-09-11 NOTE — PROGRESS NOTES
SOAP note objective information for 9/11/2018.  Please refer to the daily flowsheet for treatment today, total treatment time and time spent performing 1:1 timed codes.        Observation: Color/Temperature: Mild pink color to radial PIPj of R RF      PAIN 7/27/2018     Location Right   ring finger     Description  Aching, Miserable, Sharp, Shooting, Stabbing and Tender     Radiates yes     Worse d/n daytime and nighttime     Frequency intermittant     Exacerbated by Hand use      Relieves cold, heat and rest     At rest 0-10/10 3/10     On use 0-10/10 5/10     At worst.0-10/10 7/10     Sleep disruption?   no     Progression Gradually getting better.         Date: 7/27/2018 7/27/2018 8/17/18 8/24/2018 8/31/18 9/11/18   ring finger  AROM  (PROM) Right Left Right R R               MCP  /95 WNL /95       PIP 30/82  (20/x)   26/95  (20/x) 31/NT  post 35/  (20/x) after heat 20/ 17/  (10/x)   DIP  0/32  /65       TROM          DPC flex lag              Strength:  [x]   Contraindicated  Edema:  mild of the  ring finger  Wound/Scar: mild pinkness noted at the radial PIP from previous orthosis  Palpation:  []     Normal        [x]   Tender       []  Mild         [x]   Moderate [x]   Severe   Location: volar plate area, and radial aspect of PIP      Sensation:  WNL throughout all nerve distributions; per patient report    Assessment:  See flowsheet    Home Exercise Program:  Wear orthosis for between exercises, and at night  DIP blocking in the orthosis  8/17  Extensor tracking with very gentle pressure over top of PIP  8/24/2018  New extension orthosis  Coban under orthosis for swelling  8/31/2018  Wear finger cast full time for one week      Next Visit:  Progress PIP extension slowly and DIP flexion/tendon gliding  Progress strengthening when ready, and see if appt needed for f/u w Dr. Borja

## 2019-04-08 PROBLEM — M25.561 RIGHT KNEE PAIN: Status: RESOLVED | Noted: 2018-07-30 | Resolved: 2019-04-08

## 2020-03-02 ENCOUNTER — HEALTH MAINTENANCE LETTER (OUTPATIENT)
Age: 35
End: 2020-03-02

## 2020-11-02 ENCOUNTER — APPOINTMENT (RX ONLY)
Dept: URBAN - METROPOLITAN AREA CLINIC 292 | Facility: CLINIC | Age: 35
Setting detail: DERMATOLOGY
End: 2020-11-02

## 2020-11-02 DIAGNOSIS — L70.0 ACNE VULGARIS: ICD-10-CM

## 2020-11-02 PROCEDURE — ? PRESCRIPTION

## 2020-11-02 PROCEDURE — 99202 OFFICE O/P NEW SF 15 MIN: CPT

## 2020-11-02 PROCEDURE — ? COUNSELING

## 2020-11-02 PROCEDURE — ? TREATMENT REGIMEN

## 2020-11-02 RX ORDER — TRIFAROTENE 50 UG/G
CREAM TOPICAL
Qty: 1 | Refills: 3 | Status: ERX | COMMUNITY
Start: 2020-11-02

## 2020-11-02 RX ADMIN — TRIFAROTENE: 50 CREAM TOPICAL at 00:00

## 2020-11-02 ASSESSMENT — LOCATION DETAILED DESCRIPTION DERM: LOCATION DETAILED: LEFT INFERIOR CENTRAL MALAR CHEEK

## 2020-11-02 ASSESSMENT — LOCATION SIMPLE DESCRIPTION DERM: LOCATION SIMPLE: LEFT CHEEK

## 2020-11-02 ASSESSMENT — LOCATION ZONE DERM: LOCATION ZONE: FACE

## 2020-11-02 NOTE — PROCEDURE: TREATMENT REGIMEN
Detail Level: Zone
Otc Regimen: CeraVe foaming wash bid \\nCeraVe AM \\nCERAVe pm \\nThe Ordinary Niacinamide serum qhs after Aklief
Initiate Treatment: Aklief qhs

## 2020-11-02 NOTE — PROCEDURE: COUNSELING
Bactrim Pregnancy And Lactation Text: This medication is Pregnancy Category D and is known to cause fetal risk.  It is also excreted in breast milk.
done
Sarecycline Pregnancy And Lactation Text: This medication is Pregnancy Category D and not consider safe during pregnancy. It is also excreted in breast milk.
High Dose Vitamin A Pregnancy And Lactation Text: High dose vitamin A therapy is contraindicated during pregnancy and breast feeding.
Erythromycin Pregnancy And Lactation Text: This medication is Pregnancy Category B and is considered safe during pregnancy. It is also excreted in breast milk.
Topical Retinoid Pregnancy And Lactation Text: This medication is Pregnancy Category C. It is unknown if this medication is excreted in breast milk.
Topical Clindamycin Pregnancy And Lactation Text: This medication is Pregnancy Category B and is considered safe during pregnancy. It is unknown if it is excreted in breast milk.
Detail Level: Zone
Dapsone Counseling: I discussed with the patient the risks of dapsone including but not limited to hemolytic anemia, agranulocytosis, rashes, methemoglobinemia, kidney failure, peripheral neuropathy, headaches, GI upset, and liver toxicity.  Patients who start dapsone require monitoring including baseline LFTs and weekly CBCs for the first month, then every month thereafter.  The patient verbalized understanding of the proper use and possible adverse effects of dapsone.  All of the patient's questions and concerns were addressed.
Bactrim Counseling:  I discussed with the patient the risks of sulfa antibiotics including but not limited to GI upset, allergic reaction, drug rash, diarrhea, dizziness, photosensitivity, and yeast infections.  Rarely, more serious reactions can occur including but not limited to aplastic anemia, agranulocytosis, methemoglobinemia, blood dyscrasias, liver or kidney failure, lung infiltrates or desquamative/blistering drug rashes.
High Dose Vitamin A Counseling: Side effects reviewed, pt to contact office should one occur.
Erythromycin Counseling:  I discussed with the patient the risks of erythromycin including but not limited to GI upset, allergic reaction, drug rash, diarrhea, increase in liver enzymes, and yeast infections.
Tetracycline Counseling: Patient counseled regarding possible photosensitivity and increased risk for sunburn.  Patient instructed to avoid sunlight, if possible.  When exposed to sunlight, patients should wear protective clothing, sunglasses, and sunscreen.  The patient was instructed to call the office immediately if the following severe adverse effects occur:  hearing changes, easy bruising/bleeding, severe headache, or vision changes.  The patient verbalized understanding of the proper use and possible adverse effects of tetracycline.  All of the patient's questions and concerns were addressed. Patient understands to avoid pregnancy while on therapy due to potential birth defects.
Sarecycline Counseling: Patient advised regarding possible photosensitivity and discoloration of the teeth, skin, lips, tongue and gums.  Patient instructed to avoid sunlight, if possible.  When exposed to sunlight, patients should wear protective clothing, sunglasses, and sunscreen.  The patient was instructed to call the office immediately if the following severe adverse effects occur:  hearing changes, easy bruising/bleeding, severe headache, or vision changes.  The patient verbalized understanding of the proper use and possible adverse effects of sarecycline.  All of the patient's questions and concerns were addressed.
Topical Clindamycin Counseling: Patient counseled that this medication may cause skin irritation or allergic reactions.  In the event of skin irritation, the patient was advised to reduce the amount of the drug applied or use it less frequently.   The patient verbalized understanding of the proper use and possible adverse effects of clindamycin.  All of the patient's questions and concerns were addressed.
Include Pregnancy/Lactation Warning?: No
Topical Retinoid counseling:  Patient advised to apply a pea-sized amount only at bedtime and wait 30 minutes after washing their face before applying.  If too drying, patient may add a non-comedogenic moisturizer. The patient verbalized understanding of the proper use and possible adverse effects of retinoids.  All of the patient's questions and concerns were addressed.
Topical Sulfur Applications Pregnancy And Lactation Text: This medication is Pregnancy Category C and has an unknown safety profile during pregnancy. It is unknown if this topical medication is excreted in breast milk.
Doxycycline Pregnancy And Lactation Text: This medication is Pregnancy Category D and not consider safe during pregnancy. It is also excreted in breast milk but is considered safe for shorter treatment courses.
Birth Control Pills Pregnancy And Lactation Text: This medication should be avoided if pregnant and for the first 30 days post-partum.
Azithromycin Pregnancy And Lactation Text: This medication is considered safe during pregnancy and is also secreted in breast milk.
Isotretinoin Pregnancy And Lactation Text: This medication is Pregnancy Category X and is considered extremely dangerous during pregnancy. It is unknown if it is excreted in breast milk.
Benzoyl Peroxide Pregnancy And Lactation Text: This medication is Pregnancy Category C. It is unknown if benzoyl peroxide is excreted in breast milk.
Spironolactone Pregnancy And Lactation Text: This medication can cause feminization of the male fetus and should be avoided during pregnancy. The active metabolite is also found in breast milk.
Tazorac Pregnancy And Lactation Text: This medication is not safe during pregnancy. It is unknown if this medication is excreted in breast milk.
Birth Control Pills Counseling: Birth Control Pill Counseling: I discussed with the patient the potential side effects of OCPs including but not limited to increased risk of stroke, heart attack, thrombophlebitis, deep venous thrombosis, hepatic adenomas, breast changes, GI upset, headaches, and depression.  The patient verbalized understanding of the proper use and possible adverse effects of OCPs. All of the patient's questions and concerns were addressed.
Azithromycin Counseling:  I discussed with the patient the risks of azithromycin including but not limited to GI upset, allergic reaction, drug rash, diarrhea, and yeast infections.
Isotretinoin Counseling: Patient should get monthly blood tests, not donate blood, not drive at night if vision affected, not share medication, and not undergo elective surgery for 6 months after tx completed. Side effects reviewed, pt to contact office should one occur.
Doxycycline Counseling:  Patient counseled regarding possible photosensitivity and increased risk for sunburn.  Patient instructed to avoid sunlight, if possible.  When exposed to sunlight, patients should wear protective clothing, sunglasses, and sunscreen.  The patient was instructed to call the office immediately if the following severe adverse effects occur:  hearing changes, easy bruising/bleeding, severe headache, or vision changes.  The patient verbalized understanding of the proper use and possible adverse effects of doxycycline.  All of the patient's questions and concerns were addressed.
Benzoyl Peroxide Counseling: Patient counseled that medicine may cause skin irritation and bleach clothing.  In the event of skin irritation, the patient was advised to reduce the amount of the drug applied or use it less frequently.   The patient verbalized understanding of the proper use and possible adverse effects of benzoyl peroxide.  All of the patient's questions and concerns were addressed.
Spironolactone Counseling: Patient advised regarding risks of diarrhea, abdominal pain, hyperkalemia, birth defects (for female patients), liver toxicity and renal toxicity. The patient may need blood work to monitor liver and kidney function and potassium levels while on therapy. The patient verbalized understanding of the proper use and possible adverse effects of spironolactone.  All of the patient's questions and concerns were addressed.
Minocycline Counseling: Patient advised regarding possible photosensitivity and discoloration of the teeth, skin, lips, tongue and gums.  Patient instructed to avoid sunlight, if possible.  When exposed to sunlight, patients should wear protective clothing, sunglasses, and sunscreen.  The patient was instructed to call the office immediately if the following severe adverse effects occur:  hearing changes, easy bruising/bleeding, severe headache, or vision changes.  The patient verbalized understanding of the proper use and possible adverse effects of minocycline.  All of the patient's questions and concerns were addressed.
Topical Sulfur Applications Counseling: Topical Sulfur Counseling: Patient counseled that this medication may cause skin irritation or allergic reactions.  In the event of skin irritation, the patient was advised to reduce the amount of the drug applied or use it less frequently.   The patient verbalized understanding of the proper use and possible adverse effects of topical sulfur application.  All of the patient's questions and concerns were addressed.
Tazorac Counseling:  Patient advised that medication is irritating and drying.  Patient may need to apply sparingly and wash off after an hour before eventually leaving it on overnight.  The patient verbalized understanding of the proper use and possible adverse effects of tazorac.  All of the patient's questions and concerns were addressed.
Dapsone Pregnancy And Lactation Text: This medication is Pregnancy Category C and is not considered safe during pregnancy or breast feeding.

## 2020-12-14 ENCOUNTER — HEALTH MAINTENANCE LETTER (OUTPATIENT)
Age: 35
End: 2020-12-14

## 2020-12-24 ENCOUNTER — APPOINTMENT (RX ONLY)
Dept: URBAN - METROPOLITAN AREA CLINIC 303 | Facility: CLINIC | Age: 35
Setting detail: DERMATOLOGY
End: 2020-12-24

## 2020-12-24 DIAGNOSIS — Z02.9 ENCOUNTER FOR ADMINISTRATIVE EXAMINATIONS, UNSPECIFIED: ICD-10-CM

## 2020-12-24 PROCEDURE — ? NO SHOW PLAN

## 2021-01-25 ENCOUNTER — APPOINTMENT (RX ONLY)
Dept: URBAN - METROPOLITAN AREA CLINIC 292 | Facility: CLINIC | Age: 36
Setting detail: DERMATOLOGY
End: 2021-01-25

## 2021-01-25 VITALS — TEMPERATURE: 98.6 F

## 2021-01-25 DIAGNOSIS — L95.8 OTHER VASCULITIS LIMITED TO THE SKIN: ICD-10-CM | Status: INADEQUATELY CONTROLLED

## 2021-01-25 DIAGNOSIS — L71.8 OTHER ROSACEA: ICD-10-CM | Status: INADEQUATELY CONTROLLED

## 2021-01-25 PROBLEM — L30.9 DERMATITIS, UNSPECIFIED: Status: ACTIVE | Noted: 2021-01-25

## 2021-01-25 PROCEDURE — ? PRESCRIPTION

## 2021-01-25 PROCEDURE — ? FULL BODY SKIN EXAM - DECLINED

## 2021-01-25 PROCEDURE — 99214 OFFICE O/P EST MOD 30 MIN: CPT | Mod: 25

## 2021-01-25 PROCEDURE — ? ADDITIONAL NOTES

## 2021-01-25 PROCEDURE — 11102 TANGNTL BX SKIN SINGLE LES: CPT

## 2021-01-25 PROCEDURE — ? BIOPSY BY SHAVE METHOD

## 2021-01-25 PROCEDURE — ? COUNSELING

## 2021-01-25 PROCEDURE — ? ORDER TESTS

## 2021-01-25 RX ORDER — TRIAMCINOLONE ACETONIDE 1 MG/G
CREAM TOPICAL
Qty: 1 | Refills: 3 | Status: ERX | COMMUNITY
Start: 2021-01-25

## 2021-01-25 RX ORDER — METRONIDAZOLE 10 MG/G
GEL TOPICAL
Qty: 1 | Refills: 2 | Status: ERX | COMMUNITY
Start: 2021-01-25

## 2021-01-25 RX ADMIN — TRIAMCINOLONE ACETONIDE: 1 CREAM TOPICAL at 00:00

## 2021-01-25 RX ADMIN — METRONIDAZOLE: 10 GEL TOPICAL at 00:00

## 2021-01-25 ASSESSMENT — LOCATION SIMPLE DESCRIPTION DERM
LOCATION SIMPLE: LEFT PRETIBIAL REGION
LOCATION SIMPLE: RIGHT PRETIBIAL REGION
LOCATION SIMPLE: RIGHT CHEEK
LOCATION SIMPLE: RIGHT CALF
LOCATION SIMPLE: LEFT CHEEK
LOCATION SIMPLE: LEFT CALF

## 2021-01-25 ASSESSMENT — LOCATION DETAILED DESCRIPTION DERM
LOCATION DETAILED: RIGHT MEDIAL MALAR CHEEK
LOCATION DETAILED: LEFT PROXIMAL PRETIBIAL REGION
LOCATION DETAILED: LEFT DISTAL PRETIBIAL REGION
LOCATION DETAILED: LEFT PROXIMAL CALF
LOCATION DETAILED: RIGHT DISTAL CALF
LOCATION DETAILED: LEFT CENTRAL MALAR CHEEK
LOCATION DETAILED: RIGHT DISTAL PRETIBIAL REGION

## 2021-01-25 ASSESSMENT — LOCATION ZONE DERM
LOCATION ZONE: LEG
LOCATION ZONE: FACE

## 2021-01-25 NOTE — PROCEDURE: ADDITIONAL NOTES
Additional Notes: Pending labs and biopsy, I will recommend referral to [facility] for best multidisciplinary care.\\n\\nCase discussed with Dr. Israel and he agrees with treatment plan. Additional Notes: Pending labs and biopsy, I will recommend referral to [facility] for best multidisciplinary care.\\n\\nCase discussed with Dr. Israel and he agrees with treatment plan.

## 2021-01-25 NOTE — HPI: RASH
What Type Of Note Output Would You Prefer (Optional)?: Standard Output
Is The Patient Presenting As Previously Scheduled?: Yes
How Severe Is Your Rash?: mild
Is This A New Presentation, Or A Follow-Up?: Rash
Additional History: Pt was evaluated in the ER and was told to go to dermatology for further evaluation and a biopsy. 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
Additional History: Pt has used metronidazole gel in the past with significant relief.

## 2021-01-25 NOTE — PROCEDURE: BIOPSY BY SHAVE METHOD
Detail Level: Detailed
Depth Of Biopsy: dermis
Was A Bandage Applied: Yes
Size Of Lesion In Cm: 0
Biopsy Type: H and E
Biopsy Method: Dermablade
Anesthesia Type: 1% lidocaine with epinephrine
Anesthesia Volume In Cc (Will Not Render If 0): 0.5
Hemostasis: Justino's
Wound Care: Polysporin ointment
Dressing: bandage
Destruction After The Procedure: No
Type Of Destruction Used: Electrodesiccation
Curettage Text: The wound bed was treated with curettage after the biopsy was performed.
Cryotherapy Text: The wound bed was treated with cryotherapy after the biopsy was performed.
Electrodesiccation Text: The wound bed was treated with electrodesiccation after the biopsy was performed.
Electrodesiccation And Curettage Text: The wound bed was treated with electrodesiccation and curettage after the biopsy was performed.
Silver Nitrate Text: The wound bed was treated with silver nitrate after the biopsy was performed.
Lab: 6
Lab Facility: [id number]
Consent: Written consent was obtained and risks were reviewed including but not limited to scarring, infection, bleeding, scabbing, incomplete removal, nerve damage and allergy to anesthesia.
Post-Care Instructions: I reviewed with the patient in detail post-care instructions. Patient is to keep the biopsy site dry overnight, and then apply bacitracin twice daily until healed. Patient may apply hydrogen peroxide soaks to remove any crusting.
Notification Instructions: Patient will be notified of biopsy results. However, patient instructed to call the office if not contacted within 2 weeks.
Billing Type: Third-Party Bill
Information: Selecting Yes will display possible errors in your note based on the variables you have selected. This validation is only offered as a suggestion for you. PLEASE NOTE THAT THE VALIDATION TEXT WILL BE REMOVED WHEN YOU FINALIZE YOUR NOTE. IF YOU WANT TO FAX A PRELIMINARY NOTE YOU WILL NEED TO TOGGLE THIS TO 'NO' IF YOU DO NOT WANT IT IN YOUR FAXED NOTE.

## 2021-01-25 NOTE — PROCEDURE: ORDER TESTS
Billing Type: Third-Party Bill
Expected Date Of Service: 01/25/2021
Bill For Surgical Tray: no
Performing Laboratory: [id number]

## 2021-02-03 ENCOUNTER — RX ONLY (OUTPATIENT)
Age: 36
Setting detail: RX ONLY
End: 2021-02-03

## 2021-02-03 RX ORDER — METRONIDAZOLE 7.5 MG/G
CREAM TOPICAL
Qty: 1 | Refills: 2 | Status: ERX | COMMUNITY
Start: 2021-02-03

## 2021-04-18 ENCOUNTER — HEALTH MAINTENANCE LETTER (OUTPATIENT)
Age: 36
End: 2021-04-18

## 2021-05-28 ENCOUNTER — RECORDS - HEALTHEAST (OUTPATIENT)
Dept: ADMINISTRATIVE | Facility: CLINIC | Age: 36
End: 2021-05-28

## 2021-05-30 ENCOUNTER — RECORDS - HEALTHEAST (OUTPATIENT)
Dept: ADMINISTRATIVE | Facility: CLINIC | Age: 36
End: 2021-05-30

## 2021-06-01 ENCOUNTER — RECORDS - HEALTHEAST (OUTPATIENT)
Dept: ADMINISTRATIVE | Facility: CLINIC | Age: 36
End: 2021-06-01

## 2021-06-04 ENCOUNTER — RECORDS - HEALTHEAST (OUTPATIENT)
Dept: ADMINISTRATIVE | Facility: CLINIC | Age: 36
End: 2021-06-04

## 2021-10-02 ENCOUNTER — HEALTH MAINTENANCE LETTER (OUTPATIENT)
Age: 36
End: 2021-10-02

## 2022-05-14 ENCOUNTER — HEALTH MAINTENANCE LETTER (OUTPATIENT)
Age: 37
End: 2022-05-14

## 2022-09-03 ENCOUNTER — HEALTH MAINTENANCE LETTER (OUTPATIENT)
Age: 37
End: 2022-09-03

## 2023-02-09 NOTE — TELEPHONE ENCOUNTER
TC to patient. Message left: re all labs reviewed, released on My Chart and letter sent. Call if questions. Nadeen SNOW CNP  
negative

## 2023-06-03 ENCOUNTER — HEALTH MAINTENANCE LETTER (OUTPATIENT)
Age: 38
End: 2023-06-03

## 2024-04-13 ENCOUNTER — HOSPITAL ENCOUNTER (EMERGENCY)
Facility: CLINIC | Age: 39
Discharge: HOME | End: 2024-04-13
Attending: FAMILY MEDICINE | Admitting: FAMILY MEDICINE
Payer: MEDICARE

## 2024-04-13 VITALS
HEART RATE: 112 BPM | HEIGHT: 67 IN | DIASTOLIC BLOOD PRESSURE: 90 MMHG | BODY MASS INDEX: 24.75 KG/M2 | OXYGEN SATURATION: 94 % | SYSTOLIC BLOOD PRESSURE: 142 MMHG | RESPIRATION RATE: 11 BRPM | TEMPERATURE: 98.4 F

## 2024-04-13 DIAGNOSIS — R11.2 NAUSEA AND VOMITING, UNSPECIFIED VOMITING TYPE: ICD-10-CM

## 2024-04-13 LAB
ALBUMIN SERPL BCG-MCNC: 4.7 G/DL (ref 3.5–5.2)
ALP SERPL-CCNC: 78 U/L (ref 40–150)
ALT SERPL W P-5'-P-CCNC: 9 U/L (ref 0–50)
ANION GAP SERPL CALCULATED.3IONS-SCNC: 15 MMOL/L (ref 7–15)
AST SERPL W P-5'-P-CCNC: 17 U/L (ref 0–45)
BASOPHILS # BLD AUTO: 0 10E3/UL (ref 0–0.2)
BASOPHILS NFR BLD AUTO: 0 %
BILIRUB SERPL-MCNC: 1.2 MG/DL
BUN SERPL-MCNC: 5.5 MG/DL (ref 6–20)
CALCIUM SERPL-MCNC: 9.7 MG/DL (ref 8.6–10)
CHLORIDE SERPL-SCNC: 98 MMOL/L (ref 98–107)
CREAT SERPL-MCNC: 0.6 MG/DL (ref 0.51–0.95)
DEPRECATED HCO3 PLAS-SCNC: 22 MMOL/L (ref 22–29)
EGFRCR SERPLBLD CKD-EPI 2021: >90 ML/MIN/1.73M2
EOSINOPHIL # BLD AUTO: 0 10E3/UL (ref 0–0.7)
EOSINOPHIL NFR BLD AUTO: 0 %
ERYTHROCYTE [DISTWIDTH] IN BLOOD BY AUTOMATED COUNT: 12.2 % (ref 10–15)
GLUCOSE SERPL-MCNC: 119 MG/DL (ref 70–99)
HCG SERPL QL: NEGATIVE
HCT VFR BLD AUTO: 42.5 % (ref 35–47)
HGB BLD-MCNC: 15.2 G/DL (ref 11.7–15.7)
HOLD SPECIMEN: NORMAL
IMM GRANULOCYTES # BLD: 0 10E3/UL
IMM GRANULOCYTES NFR BLD: 0 %
LIPASE SERPL-CCNC: 19 U/L (ref 13–60)
LYMPHOCYTES # BLD AUTO: 1.8 10E3/UL (ref 0.8–5.3)
LYMPHOCYTES NFR BLD AUTO: 13 %
MCH RBC QN AUTO: 30.8 PG (ref 26.5–33)
MCHC RBC AUTO-ENTMCNC: 35.8 G/DL (ref 31.5–36.5)
MCV RBC AUTO: 86 FL (ref 78–100)
MONOCYTES # BLD AUTO: 0.6 10E3/UL (ref 0–1.3)
MONOCYTES NFR BLD AUTO: 4 %
NEUTROPHILS # BLD AUTO: 11.7 10E3/UL (ref 1.6–8.3)
NEUTROPHILS NFR BLD AUTO: 83 %
NRBC # BLD AUTO: 0 10E3/UL
NRBC BLD AUTO-RTO: 0 /100
PLATELET # BLD AUTO: 419 10E3/UL (ref 150–450)
POTASSIUM SERPL-SCNC: 3.5 MMOL/L (ref 3.4–5.3)
PROT SERPL-MCNC: 8.3 G/DL (ref 6.4–8.3)
RBC # BLD AUTO: 4.94 10E6/UL (ref 3.8–5.2)
SODIUM SERPL-SCNC: 135 MMOL/L (ref 135–145)
WBC # BLD AUTO: 14.2 10E3/UL (ref 4–11)

## 2024-04-13 PROCEDURE — 80053 COMPREHEN METABOLIC PANEL: CPT | Performed by: FAMILY MEDICINE

## 2024-04-13 PROCEDURE — 96361 HYDRATE IV INFUSION ADD-ON: CPT | Performed by: FAMILY MEDICINE

## 2024-04-13 PROCEDURE — 85025 COMPLETE CBC W/AUTO DIFF WBC: CPT | Performed by: FAMILY MEDICINE

## 2024-04-13 PROCEDURE — 250N000011 HC RX IP 250 OP 636

## 2024-04-13 PROCEDURE — 84703 CHORIONIC GONADOTROPIN ASSAY: CPT | Performed by: FAMILY MEDICINE

## 2024-04-13 PROCEDURE — 83520 IMMUNOASSAY QUANT NOS NONAB: CPT | Performed by: FAMILY MEDICINE

## 2024-04-13 PROCEDURE — 83690 ASSAY OF LIPASE: CPT | Performed by: FAMILY MEDICINE

## 2024-04-13 PROCEDURE — 96374 THER/PROPH/DIAG INJ IV PUSH: CPT | Performed by: FAMILY MEDICINE

## 2024-04-13 PROCEDURE — 99285 EMERGENCY DEPT VISIT HI MDM: CPT | Mod: 25 | Performed by: FAMILY MEDICINE

## 2024-04-13 PROCEDURE — 258N000003 HC RX IP 258 OP 636: Performed by: FAMILY MEDICINE

## 2024-04-13 PROCEDURE — 96376 TX/PRO/DX INJ SAME DRUG ADON: CPT | Performed by: FAMILY MEDICINE

## 2024-04-13 PROCEDURE — 250N000013 HC RX MED GY IP 250 OP 250 PS 637: Performed by: FAMILY MEDICINE

## 2024-04-13 PROCEDURE — 250N000011 HC RX IP 250 OP 636: Mod: JZ | Performed by: FAMILY MEDICINE

## 2024-04-13 PROCEDURE — 99284 EMERGENCY DEPT VISIT MOD MDM: CPT | Mod: 25 | Performed by: FAMILY MEDICINE

## 2024-04-13 PROCEDURE — 93010 ELECTROCARDIOGRAM REPORT: CPT | Performed by: FAMILY MEDICINE

## 2024-04-13 PROCEDURE — 36415 COLL VENOUS BLD VENIPUNCTURE: CPT | Performed by: FAMILY MEDICINE

## 2024-04-13 PROCEDURE — 96375 TX/PRO/DX INJ NEW DRUG ADDON: CPT | Performed by: FAMILY MEDICINE

## 2024-04-13 PROCEDURE — 93005 ELECTROCARDIOGRAM TRACING: CPT | Performed by: FAMILY MEDICINE

## 2024-04-13 RX ORDER — SODIUM CHLORIDE, SODIUM LACTATE, POTASSIUM CHLORIDE, CALCIUM CHLORIDE 600; 310; 30; 20 MG/100ML; MG/100ML; MG/100ML; MG/100ML
1000 INJECTION, SOLUTION INTRAVENOUS CONTINUOUS
Status: DISCONTINUED | OUTPATIENT
Start: 2024-04-13 | End: 2024-04-13 | Stop reason: HOSPADM

## 2024-04-13 RX ORDER — BENZTROPINE MESYLATE 1 MG/ML
1 INJECTION, SOLUTION INTRAMUSCULAR; INTRAVENOUS ONCE
Status: COMPLETED | OUTPATIENT
Start: 2024-04-13 | End: 2024-04-13

## 2024-04-13 RX ORDER — LORAZEPAM 2 MG/ML
1 INJECTION INTRAMUSCULAR ONCE
Status: DISCONTINUED | OUTPATIENT
Start: 2024-04-13 | End: 2024-04-13

## 2024-04-13 RX ORDER — DROPERIDOL 2.5 MG/ML
1.25 INJECTION, SOLUTION INTRAMUSCULAR; INTRAVENOUS ONCE
Status: COMPLETED | OUTPATIENT
Start: 2024-04-13 | End: 2024-04-13

## 2024-04-13 RX ORDER — OXYCODONE HYDROCHLORIDE 5 MG/1
10 TABLET ORAL ONCE
Status: COMPLETED | OUTPATIENT
Start: 2024-04-13 | End: 2024-04-13

## 2024-04-13 RX ORDER — CAPSAICIN 0.025 %
CREAM (GRAM) TOPICAL 3 TIMES DAILY
Status: DISCONTINUED | OUTPATIENT
Start: 2024-04-13 | End: 2024-04-13 | Stop reason: HOSPADM

## 2024-04-13 RX ORDER — LORAZEPAM 2 MG/ML
INJECTION INTRAMUSCULAR
Status: COMPLETED
Start: 2024-04-13 | End: 2024-04-13

## 2024-04-13 RX ORDER — ONDANSETRON 2 MG/ML
4 INJECTION INTRAMUSCULAR; INTRAVENOUS ONCE
Status: COMPLETED | OUTPATIENT
Start: 2024-04-13 | End: 2024-04-13

## 2024-04-13 RX ORDER — LORAZEPAM 2 MG/ML
1 INJECTION INTRAMUSCULAR ONCE
Status: COMPLETED | OUTPATIENT
Start: 2024-04-13 | End: 2024-04-13

## 2024-04-13 RX ORDER — DIPHENHYDRAMINE HYDROCHLORIDE 50 MG/ML
50 INJECTION INTRAMUSCULAR; INTRAVENOUS ONCE
Status: COMPLETED | OUTPATIENT
Start: 2024-04-13 | End: 2024-04-13

## 2024-04-13 RX ADMIN — DIPHENHYDRAMINE HYDROCHLORIDE 50 MG: 50 INJECTION INTRAMUSCULAR; INTRAVENOUS at 16:44

## 2024-04-13 RX ADMIN — DROPERIDOL 1.25 MG: 2.5 INJECTION, SOLUTION INTRAMUSCULAR; INTRAVENOUS at 18:04

## 2024-04-13 RX ADMIN — OXYCODONE HYDROCHLORIDE 10 MG: 5 TABLET ORAL at 19:31

## 2024-04-13 RX ADMIN — CAPSAICIN: 0.25 CREAM TOPICAL at 18:19

## 2024-04-13 RX ADMIN — ONDANSETRON 4 MG: 2 INJECTION INTRAMUSCULAR; INTRAVENOUS at 15:56

## 2024-04-13 RX ADMIN — DROPERIDOL 1.25 MG: 2.5 INJECTION, SOLUTION INTRAMUSCULAR; INTRAVENOUS at 16:49

## 2024-04-13 RX ADMIN — FAMOTIDINE 20 MG: 10 INJECTION INTRAVENOUS at 16:47

## 2024-04-13 RX ADMIN — LORAZEPAM 1 MG: 2 INJECTION INTRAMUSCULAR at 18:40

## 2024-04-13 RX ADMIN — LORAZEPAM 1 MG: 2 INJECTION INTRAMUSCULAR; INTRAVENOUS at 18:40

## 2024-04-13 RX ADMIN — SODIUM CHLORIDE, POTASSIUM CHLORIDE, SODIUM LACTATE AND CALCIUM CHLORIDE 1000 ML: 600; 310; 30; 20 INJECTION, SOLUTION INTRAVENOUS at 16:53

## 2024-04-13 ASSESSMENT — ACTIVITIES OF DAILY LIVING (ADL)
ADLS_ACUITY_SCORE: 37

## 2024-04-13 ASSESSMENT — COLUMBIA-SUICIDE SEVERITY RATING SCALE - C-SSRS
2. HAVE YOU ACTUALLY HAD ANY THOUGHTS OF KILLING YOURSELF IN THE PAST MONTH?: NO
6. HAVE YOU EVER DONE ANYTHING, STARTED TO DO ANYTHING, OR PREPARED TO DO ANYTHING TO END YOUR LIFE?: NO
1. IN THE PAST MONTH, HAVE YOU WISHED YOU WERE DEAD OR WISHED YOU COULD GO TO SLEEP AND NOT WAKE UP?: NO

## 2024-04-13 NOTE — ED TRIAGE NOTES
Patient report elevated HR and vomiting, diagnosed mast cell activation syndrome, started last night at midnight. Has been vomiting bile >10 times since last night.  Has not taken any medication since yesterday.  Current -116. Sats 99%     Triage Assessment (Adult)       Row Name 04/13/24 1536          Triage Assessment    Airway WDL WDL        Respiratory WDL    Respiratory WDL WDL        Cardiac WDL    Cardiac WDL X  tachy at 115bpm        Peripheral/Neurovascular WDL    Peripheral Neurovascular WDL WDL        Cognitive/Neuro/Behavioral WDL    Cognitive/Neuro/Behavioral WDL WDL

## 2024-04-13 NOTE — ED NOTES
Pt called and states her jaw is tight after receiving droperidol, Pt is still able to speak in full sentences, Dr. Fishman updated.
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
## 2024-04-14 NOTE — DISCHARGE INSTRUCTIONS
ICD-10-CM    1. Nausea and vomiting, unspecified vomiting type  R11.2     I suspect  this cannabinoid hyperemesis.  I did perform a tryptase to compare to your basel;ine - as doubtful this is mast cell activation.  may also use capsaicin applied in a very small amount.

## 2024-04-16 LAB — TRYPTASE SERPL-MCNC: 4.2 UG/L

## 2024-04-26 NOTE — ED NOTES
Pt was having an adverse reaction to medication, and I administered 1mg IV Ativan, and 1 mg IV ativan was wasted in the sink, and Lisa MAN pts nurse witnessed waste and administration.

## 2024-06-09 ENCOUNTER — HEALTH MAINTENANCE LETTER (OUTPATIENT)
Age: 39
End: 2024-06-09

## 2025-02-28 NOTE — MR AVS SNAPSHOT
After Visit Summary   8/2/2018    Reba Arboleda    MRN: [id number]           Patient Information     Date Of Birth          1985        Visit Information        Provider Department      8/2/2018 11:00 AM Juan Carlos Banda MD [facility]        Today's Diagnoses     H/O tear of meniscus of knee joint    -  1    Chondromalacia of right patella           Follow-ups after your visit        Your next 10 appointments already scheduled     Aug 10, 2018  9:30 AM CDT   [facility] Hand with BOLA [facility] ([facility])    [address] [place] [id number] [phone]            Aug 10, 2018 10:20 AM CDT   [facility] Extremity with Nadeem MCCRAY [facility] ([facility])    [address] [place] [id number] [phone]            Aug 17, 2018  9:30 AM CDT   [facility] Hand with BOLA [facility] ([facility])    [address] [place] [id number] [phone]            Aug 17, 2018 10:20 AM CDT   (Arrive by 10:05 AM)   RETURN HAND with Erasmo Borja MD   [facility] ([facility])    [address] [place] [id number] [phone]            Aug 17, 2018 11:00 AM CDT   [facility] Extremity with Nadeem MCCRAY [facility] ([facility])    [address] [place] [id number] [phone]            Aug 24, 2018  9:30 AM CDT   [facility] Hand with B[facility] ([facility])    [address] [place] [id number] [phone]            Aug 24, 2018 10:20 AM CDT   [facility] Extremity with Nadeem MCCRAY [facility] ([facility])     [address] [facility] [address]   [phone]            Sep 11, 2018 10:00 AM CDT   (Arrive by 9:45 AM)   Return Visit with Juan Carlos Banda MD   [facility] ([facility])    [address] [facility] [id number] [phone]              Who to contact     Please call your clinic at [phone] to:    Ask questions about your health    Make or cancel appointments    Discuss your medicines    Learn about your test results    Speak to your doctor            Additional Information About Your Visit        [employer] Track Information     Needle gives you secure access to your electronic health record. If you see a primary care provider, you can also send messages to your care team and make appointments. If you have questions, please call your primary care clinic.  If you do not have a primary care provider, please call [phone] and they will assist you.      Needle is an electronic gateway that provides easy, online access to your medical records. With Needle, you can request a clinic appointment, read your test results, renew a prescription or communicate with your care team.     To access your existing account, please contact your [facility] or call [phone] for assistance.        Care EveryWhere ID     This is your Care EveryWhere ID. This could be used by other organizations to access your [facility] medical records  [id number]         Blood Pressure from Last 3 Encounters:   07/26/18 106/65   07/26/18 107/68   07/23/18 107/68    Weight from Last 3 Encounters:   07/27/18 71.7 kg (158 lb)   07/26/18 72 kg (158 lb 11.2 oz)   07/26/18 69.9 kg (154 lb)              Today, you had the following     No orders found for display         Today's Medication Changes          These changes are accurate as of 8/2/18 11:59 PM.  If you have any questions, ask your nurse or doctor.                These medicines have changed or have updated prescriptions.        Dose/Directions    loratadine 10 MG tablet   Commonly known as:  CLARITIN   This may have changed:    - when to take this  - reasons to take this        Dose:  10 mg   Take 1 tablet (10 mg) by mouth daily   Quantity:  30 tablet   Refills:  11                Primary Care Provider Office Phone # Fax #    EDY Zacarias [phone] [address] [place] [address]        Equal Access to Services     GORDY KEYES : Hadii sherry ku hadasho Soomaali, waaxda luqadaha, qaybta kaalmada adeegyada, waxay idiin hayaan adeabdiaziz moore . So [facility] [phone].    ATENCIÓN: Si habla español, tiene a laguerre disposición servicios gratuitos de asistencia lingüística. [facility] [phone].    We comply with applicable federal civil rights laws and [place] laws. We do not discriminate on the basis of race, color, national origin, age, disability, sex, sexual orientation, or gender identity.            Thank you!     Thank you for choosing [facility]  for your care. Our goal is always to provide you with excellent care. Hearing back from our patients is one way we can continue to improve our services. Please take a few minutes to complete the written survey that you may receive in the mail after your visit with us. Thank you!             Your Updated Medication List - Protect others around you: Learn how to safely use, store and throw away your medicines at www.disposemymeds.org.          This list is accurate as of 8/2/18 11:59 PM.  Always use your most recent med list.                   Brand Name Dispense Instructions for use Diagnosis    albuterol 108 (90 Base) MCG/ACT Inhaler    PROAIR HFA    8.5 g    Inhale 1-2 puffs into the lungs every 4 hours as needed for shortness of breath / dyspnea or wheezing    Mast cell disease, systemic, Uncomplicated asthma, unspecified asthma severity, unspecified  whether persistent       amitriptyline 10 MG tablet    ELAVIL    30 tablet    Start at 1 tab at bedtime for 3 days, then 2 tabs at bedtime for 3 days, then 3 tabs at bedtime.    Adjustment disorder with mixed anxiety and depressed mood, Insomnia, unspecified type       EPINEPHrine 0.3 MG/0.3ML injection 2-pack    EPIPEN 2-KOJO    0.3 mL    Inject 0.3 mLs (0.3 mg) into the muscle once as needed for anaphylaxis    Mast cell disease, systemic       fluticasone 50 MCG/ACT spray    FLONASE    1 Bottle    Spray 2 sprays into both nostrils daily    Chronic allergic rhinitis due to animal hair and dander       HYDROcodone-acetaminophen 5-325 MG per tablet    NORCO    8 tablet    Take 1 tablet by mouth every 6 hours as needed for severe pain    Injury of right ring finger, initial encounter       loratadine 10 MG tablet    CLARITIN    30 tablet    Take 1 tablet (10 mg) by mouth daily        MULTIPLE vitamin  S/WOMENS Tabs     100 tablet    Take 1 tablet by mouth daily    Adjustment disorder with mixed anxiety and depressed mood       order for DME     1 Device    Equipment being ordered: Donjoy knee/patella stabilizer brace, right    Right knee pain, EDS (Anton-Danlos syndrome)       paragard intrauterine copper      1 each by Intrauterine route once        Vitamin D (Cholecalciferol) 400 units Caps     30 capsule    Take 800 Units by mouth daily    Vitamin D deficiency          No

## 2025-06-15 ENCOUNTER — HEALTH MAINTENANCE LETTER (OUTPATIENT)
Age: 40
End: 2025-06-15